# Patient Record
Sex: MALE | Race: WHITE | Employment: OTHER | URBAN - NONMETROPOLITAN AREA
[De-identification: names, ages, dates, MRNs, and addresses within clinical notes are randomized per-mention and may not be internally consistent; named-entity substitution may affect disease eponyms.]

---

## 2019-10-07 ENCOUNTER — HOSPITAL ENCOUNTER (OUTPATIENT)
Dept: GENERAL RADIOLOGY | Age: 54
Discharge: HOME | End: 2019-10-07
Payer: MEDICAID

## 2019-10-07 ENCOUNTER — PREP FOR PROCEDURE (OUTPATIENT)
Dept: UROLOGY | Age: 54
End: 2019-10-07

## 2019-10-07 ENCOUNTER — OFFICE VISIT (OUTPATIENT)
Dept: UROLOGY | Age: 54
End: 2019-10-07
Payer: MEDICAID

## 2019-10-07 VITALS
HEIGHT: 64 IN | DIASTOLIC BLOOD PRESSURE: 69 MMHG | WEIGHT: 185 LBS | TEMPERATURE: 96.9 F | SYSTOLIC BLOOD PRESSURE: 129 MMHG | BODY MASS INDEX: 31.58 KG/M2

## 2019-10-07 DIAGNOSIS — N20.0 KIDNEY STONE: ICD-10-CM

## 2019-10-07 DIAGNOSIS — N20.1 LEFT URETERAL CALCULUS: ICD-10-CM

## 2019-10-07 DIAGNOSIS — N20.1 LEFT URETERAL STONE: ICD-10-CM

## 2019-10-07 DIAGNOSIS — N20.1 LEFT URETERAL STONE: Primary | ICD-10-CM

## 2019-10-07 LAB
APPEARANCE FLUID: NORMAL
BILIRUBIN, POC: 0
BLOOD URINE, POC: NORMAL
CLARITY, POC: CLEAR
COLOR, POC: YELLOW
GLUCOSE URINE, POC: NORMAL
KETONES, POC: NORMAL
LEUKOCYTE EST, POC: NORMAL
NITRITE, POC: NORMAL
PH, POC: 7
PROTEIN, POC: NORMAL
SPECIFIC GRAVITY, POC: 1.02
UROBILINOGEN, POC: 4

## 2019-10-07 PROCEDURE — 3017F COLORECTAL CA SCREEN DOC REV: CPT | Performed by: NURSE PRACTITIONER

## 2019-10-07 PROCEDURE — 4004F PT TOBACCO SCREEN RCVD TLK: CPT | Performed by: NURSE PRACTITIONER

## 2019-10-07 PROCEDURE — G8417 CALC BMI ABV UP PARAM F/U: HCPCS | Performed by: NURSE PRACTITIONER

## 2019-10-07 PROCEDURE — 81002 URINALYSIS NONAUTO W/O SCOPE: CPT | Performed by: NURSE PRACTITIONER

## 2019-10-07 PROCEDURE — G8484 FLU IMMUNIZE NO ADMIN: HCPCS | Performed by: NURSE PRACTITIONER

## 2019-10-07 PROCEDURE — 74018 RADEX ABDOMEN 1 VIEW: CPT

## 2019-10-07 PROCEDURE — 99203 OFFICE O/P NEW LOW 30 MIN: CPT | Performed by: NURSE PRACTITIONER

## 2019-10-07 PROCEDURE — G8427 DOCREV CUR MEDS BY ELIG CLIN: HCPCS | Performed by: NURSE PRACTITIONER

## 2019-10-07 RX ORDER — DM/P-EPHED/ACETAMINOPH/DOXYLAM
5000 LIQUID (ML) ORAL DAILY
COMMUNITY
Start: 2019-09-28

## 2019-10-07 RX ORDER — HYDROCODONE BITARTRATE AND ACETAMINOPHEN 10; 325 MG/1; MG/1
TABLET ORAL
COMMUNITY
Start: 2019-10-04 | End: 2020-07-09 | Stop reason: ALTCHOICE

## 2019-10-07 RX ORDER — FLUOXETINE HYDROCHLORIDE 20 MG/1
20 CAPSULE ORAL DAILY
COMMUNITY
Start: 2019-09-28

## 2019-10-07 RX ORDER — TAMSULOSIN HYDROCHLORIDE 0.4 MG/1
0.4 CAPSULE ORAL DAILY
COMMUNITY
Start: 2019-09-28

## 2019-10-07 RX ORDER — PANTOPRAZOLE SODIUM 40 MG/1
40 TABLET, DELAYED RELEASE ORAL DAILY
COMMUNITY
Start: 2019-09-28

## 2019-10-07 RX ORDER — LISINOPRIL 10 MG/1
10 TABLET ORAL DAILY
COMMUNITY
Start: 2019-09-28

## 2019-10-08 ENCOUNTER — HOSPITAL ENCOUNTER (OUTPATIENT)
Age: 54
Setting detail: OUTPATIENT SURGERY
Discharge: HOME | End: 2019-10-08
Attending: UROLOGY | Admitting: UROLOGY
Payer: MEDICAID

## 2019-10-08 ENCOUNTER — ANESTHESIA (OUTPATIENT)
Dept: OPERATING ROOM | Age: 54
End: 2019-10-08
Payer: MEDICAID

## 2019-10-08 ENCOUNTER — ANESTHESIA EVENT (OUTPATIENT)
Dept: OPERATING ROOM | Age: 54
End: 2019-10-08
Payer: MEDICAID

## 2019-10-08 ENCOUNTER — HOSPITAL ENCOUNTER (OUTPATIENT)
Dept: GENERAL RADIOLOGY | Age: 54
Discharge: HOME | End: 2019-10-08
Payer: MEDICAID

## 2019-10-08 ENCOUNTER — APPOINTMENT (OUTPATIENT)
Dept: GENERAL RADIOLOGY | Age: 54
End: 2019-10-08
Attending: UROLOGY
Payer: MEDICAID

## 2019-10-08 VITALS
WEIGHT: 185 LBS | BODY MASS INDEX: 31.58 KG/M2 | HEART RATE: 71 BPM | DIASTOLIC BLOOD PRESSURE: 88 MMHG | HEIGHT: 64 IN | TEMPERATURE: 97.8 F | SYSTOLIC BLOOD PRESSURE: 129 MMHG | RESPIRATION RATE: 18 BRPM | OXYGEN SATURATION: 97 %

## 2019-10-08 VITALS
SYSTOLIC BLOOD PRESSURE: 123 MMHG | RESPIRATION RATE: 8 BRPM | DIASTOLIC BLOOD PRESSURE: 76 MMHG | OXYGEN SATURATION: 100 %

## 2019-10-08 DIAGNOSIS — Z98.890 S/P CYSTOSCOPY: ICD-10-CM

## 2019-10-08 LAB
EKG P AXIS: 86 DEGREES
EKG P-R INTERVAL: 226 MS
EKG Q-T INTERVAL: 382 MS
EKG QRS DURATION: 80 MS
EKG QTC CALCULATION (BAZETT): 401 MS
EKG T AXIS: 39 DEGREES
HCT VFR BLD CALC: 38.9 % (ref 42–52)
HEMOGLOBIN: 12.8 G/DL (ref 14–18)
MCH RBC QN AUTO: 30.9 PG (ref 27–31)
MCHC RBC AUTO-ENTMCNC: 32.9 G/DL (ref 33–37)
MCV RBC AUTO: 94 FL (ref 80–94)
PDW BLD-RTO: 12.4 % (ref 11.5–14.5)
PLATELET # BLD: 345 K/UL (ref 130–400)
PMV BLD AUTO: 9.2 FL (ref 9.4–12.4)
RBC # BLD: 4.14 M/UL (ref 4.7–6.1)
WBC # BLD: 8.9 K/UL (ref 4.8–10.8)

## 2019-10-08 PROCEDURE — 52356 CYSTO/URETERO W/LITHOTRIPSY: CPT | Performed by: UROLOGY

## 2019-10-08 PROCEDURE — 3600000014 HC SURGERY LEVEL 4 ADDTL 15MIN: Performed by: UROLOGY

## 2019-10-08 PROCEDURE — 2720000010 HC SURG SUPPLY STERILE: Performed by: UROLOGY

## 2019-10-08 PROCEDURE — C2617 STENT, NON-COR, TEM W/O DEL: HCPCS | Performed by: UROLOGY

## 2019-10-08 PROCEDURE — C1894 INTRO/SHEATH, NON-LASER: HCPCS | Performed by: UROLOGY

## 2019-10-08 PROCEDURE — 7100000001 HC PACU RECOVERY - ADDTL 15 MIN: Performed by: UROLOGY

## 2019-10-08 PROCEDURE — 6360000002 HC RX W HCPCS: Performed by: ANESTHESIOLOGY

## 2019-10-08 PROCEDURE — 7100000010 HC PHASE II RECOVERY - FIRST 15 MIN: Performed by: UROLOGY

## 2019-10-08 PROCEDURE — 3600000004 HC SURGERY LEVEL 4 BASE: Performed by: UROLOGY

## 2019-10-08 PROCEDURE — 7100000000 HC PACU RECOVERY - FIRST 15 MIN: Performed by: UROLOGY

## 2019-10-08 PROCEDURE — 3700000000 HC ANESTHESIA ATTENDED CARE: Performed by: UROLOGY

## 2019-10-08 PROCEDURE — 93010 ELECTROCARDIOGRAM REPORT: CPT | Performed by: INTERNAL MEDICINE

## 2019-10-08 PROCEDURE — 6370000000 HC RX 637 (ALT 250 FOR IP): Performed by: ANESTHESIOLOGY

## 2019-10-08 PROCEDURE — 2709999900 HC NON-CHARGEABLE SUPPLY: Performed by: UROLOGY

## 2019-10-08 PROCEDURE — 2580000003 HC RX 258: Performed by: ANESTHESIOLOGY

## 2019-10-08 PROCEDURE — 3209999900 FLUORO FOR SURGICAL PROCEDURES

## 2019-10-08 PROCEDURE — C1758 CATHETER, URETERAL: HCPCS | Performed by: UROLOGY

## 2019-10-08 PROCEDURE — 3700000001 HC ADD 15 MINUTES (ANESTHESIA): Performed by: UROLOGY

## 2019-10-08 PROCEDURE — 2500000003 HC RX 250 WO HCPCS: Performed by: ANESTHESIOLOGY

## 2019-10-08 PROCEDURE — C1769 GUIDE WIRE: HCPCS | Performed by: UROLOGY

## 2019-10-08 PROCEDURE — 93005 ELECTROCARDIOGRAM TRACING: CPT | Performed by: ANESTHESIOLOGY

## 2019-10-08 PROCEDURE — 36415 COLL VENOUS BLD VENIPUNCTURE: CPT

## 2019-10-08 PROCEDURE — 74420 UROGRAPHY RTRGR +-KUB: CPT

## 2019-10-08 PROCEDURE — 85027 COMPLETE CBC AUTOMATED: CPT

## 2019-10-08 DEVICE — URETERAL STENT
Type: IMPLANTABLE DEVICE | Site: URETER | Status: FUNCTIONAL
Brand: POLARIS™ ULTRA

## 2019-10-08 RX ORDER — SCOLOPAMINE TRANSDERMAL SYSTEM 1 MG/1
1 PATCH, EXTENDED RELEASE TRANSDERMAL
Status: DISCONTINUED | OUTPATIENT
Start: 2019-10-08 | End: 2019-10-08 | Stop reason: HOSPADM

## 2019-10-08 RX ORDER — MIDAZOLAM HYDROCHLORIDE 1 MG/ML
INJECTION INTRAMUSCULAR; INTRAVENOUS PRN
Status: DISCONTINUED | OUTPATIENT
Start: 2019-10-08 | End: 2019-10-08 | Stop reason: SDUPTHER

## 2019-10-08 RX ORDER — DEXAMETHASONE SODIUM PHOSPHATE 10 MG/ML
INJECTION INTRAMUSCULAR; INTRAVENOUS PRN
Status: DISCONTINUED | OUTPATIENT
Start: 2019-10-08 | End: 2019-10-08 | Stop reason: SDUPTHER

## 2019-10-08 RX ORDER — SODIUM CHLORIDE 0.9 % (FLUSH) 0.9 %
10 SYRINGE (ML) INJECTION EVERY 12 HOURS SCHEDULED
Status: DISCONTINUED | OUTPATIENT
Start: 2019-10-08 | End: 2019-10-08 | Stop reason: HOSPADM

## 2019-10-08 RX ORDER — LIDOCAINE HYDROCHLORIDE 10 MG/ML
INJECTION, SOLUTION INFILTRATION; PERINEURAL PRN
Status: DISCONTINUED | OUTPATIENT
Start: 2019-10-08 | End: 2019-10-08 | Stop reason: SDUPTHER

## 2019-10-08 RX ORDER — CIPROFLOXACIN 2 MG/ML
INJECTION, SOLUTION INTRAVENOUS PRN
Status: DISCONTINUED | OUTPATIENT
Start: 2019-10-08 | End: 2019-10-08 | Stop reason: SDUPTHER

## 2019-10-08 RX ORDER — FENTANYL CITRATE 50 UG/ML
50 INJECTION, SOLUTION INTRAMUSCULAR; INTRAVENOUS
Status: COMPLETED | OUTPATIENT
Start: 2019-10-08 | End: 2019-10-08

## 2019-10-08 RX ORDER — FENTANYL CITRATE 50 UG/ML
INJECTION, SOLUTION INTRAMUSCULAR; INTRAVENOUS PRN
Status: DISCONTINUED | OUTPATIENT
Start: 2019-10-08 | End: 2019-10-08 | Stop reason: SDUPTHER

## 2019-10-08 RX ORDER — PROPOFOL 10 MG/ML
INJECTION, EMULSION INTRAVENOUS PRN
Status: DISCONTINUED | OUTPATIENT
Start: 2019-10-08 | End: 2019-10-08 | Stop reason: SDUPTHER

## 2019-10-08 RX ORDER — LIDOCAINE HYDROCHLORIDE 10 MG/ML
1 INJECTION, SOLUTION EPIDURAL; INFILTRATION; INTRACAUDAL; PERINEURAL
Status: DISCONTINUED | OUTPATIENT
Start: 2019-10-08 | End: 2019-10-08 | Stop reason: HOSPADM

## 2019-10-08 RX ORDER — CIPROFLOXACIN 2 MG/ML
400 INJECTION, SOLUTION INTRAVENOUS ONCE
Status: DISCONTINUED | OUTPATIENT
Start: 2019-10-08 | End: 2019-10-08 | Stop reason: HOSPADM

## 2019-10-08 RX ORDER — EPHEDRINE SULFATE 50 MG/ML
INJECTION, SOLUTION INTRAVENOUS PRN
Status: DISCONTINUED | OUTPATIENT
Start: 2019-10-08 | End: 2019-10-08 | Stop reason: SDUPTHER

## 2019-10-08 RX ORDER — KETOROLAC TROMETHAMINE 30 MG/ML
INJECTION, SOLUTION INTRAMUSCULAR; INTRAVENOUS PRN
Status: DISCONTINUED | OUTPATIENT
Start: 2019-10-08 | End: 2019-10-08 | Stop reason: SDUPTHER

## 2019-10-08 RX ORDER — SODIUM CHLORIDE 0.9 % (FLUSH) 0.9 %
10 SYRINGE (ML) INJECTION PRN
Status: DISCONTINUED | OUTPATIENT
Start: 2019-10-08 | End: 2019-10-08 | Stop reason: HOSPADM

## 2019-10-08 RX ORDER — SODIUM CHLORIDE, SODIUM LACTATE, POTASSIUM CHLORIDE, CALCIUM CHLORIDE 600; 310; 30; 20 MG/100ML; MG/100ML; MG/100ML; MG/100ML
INJECTION, SOLUTION INTRAVENOUS CONTINUOUS
Status: DISCONTINUED | OUTPATIENT
Start: 2019-10-08 | End: 2019-10-08 | Stop reason: HOSPADM

## 2019-10-08 RX ORDER — ONDANSETRON 2 MG/ML
INJECTION INTRAMUSCULAR; INTRAVENOUS PRN
Status: DISCONTINUED | OUTPATIENT
Start: 2019-10-08 | End: 2019-10-08 | Stop reason: SDUPTHER

## 2019-10-08 RX ORDER — MIDAZOLAM HYDROCHLORIDE 1 MG/ML
2 INJECTION INTRAMUSCULAR; INTRAVENOUS
Status: DISCONTINUED | OUTPATIENT
Start: 2019-10-08 | End: 2019-10-08 | Stop reason: HOSPADM

## 2019-10-08 RX ORDER — FENTANYL CITRATE 50 UG/ML
25 INJECTION, SOLUTION INTRAMUSCULAR; INTRAVENOUS
Status: DISCONTINUED | OUTPATIENT
Start: 2019-10-08 | End: 2019-10-08 | Stop reason: HOSPADM

## 2019-10-08 RX ADMIN — FENTANYL CITRATE 100 MCG: 50 INJECTION INTRAMUSCULAR; INTRAVENOUS at 13:38

## 2019-10-08 RX ADMIN — CIPROFLOXACIN 400 MG: 2 INJECTION INTRAVENOUS at 13:43

## 2019-10-08 RX ADMIN — MIDAZOLAM 2 MG: 1 INJECTION INTRAMUSCULAR; INTRAVENOUS at 13:31

## 2019-10-08 RX ADMIN — PROPOFOL 150 MG: 10 INJECTION, EMULSION INTRAVENOUS at 13:38

## 2019-10-08 RX ADMIN — SODIUM CHLORIDE, POTASSIUM CHLORIDE, SODIUM LACTATE AND CALCIUM CHLORIDE: 600; 310; 30; 20 INJECTION, SOLUTION INTRAVENOUS at 13:09

## 2019-10-08 RX ADMIN — KETOROLAC TROMETHAMINE 30 MG: 30 INJECTION, SOLUTION INTRAMUSCULAR; INTRAVENOUS at 14:16

## 2019-10-08 RX ADMIN — FENTANYL CITRATE 50 MCG: 50 INJECTION, SOLUTION INTRAMUSCULAR; INTRAVENOUS at 14:49

## 2019-10-08 RX ADMIN — EPHEDRINE SULFATE 5 MG: 50 INJECTION INTRAMUSCULAR; INTRAVENOUS; SUBCUTANEOUS at 14:05

## 2019-10-08 RX ADMIN — PROPOFOL 50 MG: 10 INJECTION, EMULSION INTRAVENOUS at 13:42

## 2019-10-08 RX ADMIN — ONDANSETRON HYDROCHLORIDE 4 MG: 2 INJECTION, SOLUTION INTRAMUSCULAR; INTRAVENOUS at 13:52

## 2019-10-08 RX ADMIN — DEXAMETHASONE SODIUM PHOSPHATE 10 MG: 10 INJECTION INTRAMUSCULAR; INTRAVENOUS at 13:52

## 2019-10-08 RX ADMIN — LIDOCAINE HYDROCHLORIDE 100 MG: 10 INJECTION, SOLUTION INFILTRATION; PERINEURAL at 13:39

## 2019-10-08 ASSESSMENT — LIFESTYLE VARIABLES: SMOKING_STATUS: 0

## 2019-10-08 ASSESSMENT — PAIN SCALES - GENERAL
PAINLEVEL_OUTOF10: 0
PAINLEVEL_OUTOF10: 7
PAINLEVEL_OUTOF10: 0

## 2019-10-08 ASSESSMENT — PAIN - FUNCTIONAL ASSESSMENT: PAIN_FUNCTIONAL_ASSESSMENT: 0-10

## 2019-10-09 ENCOUNTER — TELEPHONE (OUTPATIENT)
Dept: UROLOGY | Age: 54
End: 2019-10-09

## 2019-10-17 ENCOUNTER — OFFICE VISIT (OUTPATIENT)
Dept: UROLOGY | Age: 54
End: 2019-10-17
Payer: MEDICAID

## 2019-10-17 VITALS
TEMPERATURE: 97.7 F | SYSTOLIC BLOOD PRESSURE: 126 MMHG | HEIGHT: 66 IN | DIASTOLIC BLOOD PRESSURE: 67 MMHG | WEIGHT: 185 LBS | BODY MASS INDEX: 29.73 KG/M2

## 2019-10-17 DIAGNOSIS — Z46.6 ENCOUNTER FOR REMOVAL OF URETERAL STENT: Primary | ICD-10-CM

## 2019-10-17 DIAGNOSIS — Z80.42 FAMILY HISTORY OF PROSTATE CANCER: ICD-10-CM

## 2019-10-17 DIAGNOSIS — N20.1 LEFT URETERAL CALCULUS: ICD-10-CM

## 2019-10-17 DIAGNOSIS — Z87.448 HX OF HYDRONEPHROSIS: ICD-10-CM

## 2019-10-17 DIAGNOSIS — Z80.51 FAMILY HISTORY OF KIDNEY CANCER: ICD-10-CM

## 2019-10-17 LAB
BACTERIA URINE, POC: ABNORMAL
BILIRUBIN URINE: 0 MG/DL
BLOOD, URINE: POSITIVE
CASTS URINE, POC: ABNORMAL
CLARITY: ABNORMAL
COLOR: YELLOW
CRYSTALS URINE, POC: ABNORMAL
EPI CELLS URINE, POC: ABNORMAL
GLUCOSE URINE: ABNORMAL
KETONES, URINE: NEGATIVE
LEUKOCYTE EST, POC: ABNORMAL
NITRITE, URINE: NEGATIVE
PH UA: 6.5 (ref 4.5–8)
PROTEIN UA: POSITIVE
RBC URINE, POC: ABNORMAL
SPECIFIC GRAVITY UA: 1.02 (ref 1–1.03)
UROBILINOGEN, URINE: NORMAL
WBC URINE, POC: ABNORMAL
YEAST URINE, POC: ABNORMAL

## 2019-10-17 PROCEDURE — 4004F PT TOBACCO SCREEN RCVD TLK: CPT | Performed by: NURSE PRACTITIONER

## 2019-10-17 PROCEDURE — 99214 OFFICE O/P EST MOD 30 MIN: CPT | Performed by: NURSE PRACTITIONER

## 2019-10-17 PROCEDURE — G8417 CALC BMI ABV UP PARAM F/U: HCPCS | Performed by: NURSE PRACTITIONER

## 2019-10-17 PROCEDURE — 81001 URINALYSIS AUTO W/SCOPE: CPT | Performed by: NURSE PRACTITIONER

## 2019-10-17 PROCEDURE — 3017F COLORECTAL CA SCREEN DOC REV: CPT | Performed by: NURSE PRACTITIONER

## 2019-10-17 PROCEDURE — G8484 FLU IMMUNIZE NO ADMIN: HCPCS | Performed by: NURSE PRACTITIONER

## 2019-10-17 PROCEDURE — G8427 DOCREV CUR MEDS BY ELIG CLIN: HCPCS | Performed by: NURSE PRACTITIONER

## 2019-11-21 ENCOUNTER — HOSPITAL ENCOUNTER (OUTPATIENT)
Dept: ULTRASOUND IMAGING | Age: 54
Discharge: HOME | End: 2019-11-21
Payer: MEDICAID

## 2019-11-21 DIAGNOSIS — Z87.448 HX OF HYDRONEPHROSIS: ICD-10-CM

## 2019-11-21 DIAGNOSIS — Z80.42 FAMILY HISTORY OF PROSTATE CANCER: ICD-10-CM

## 2019-11-21 LAB — PROSTATE SPECIFIC ANTIGEN: 1.67 NG/ML (ref 0–4)

## 2019-11-21 PROCEDURE — 76770 US EXAM ABDO BACK WALL COMP: CPT

## 2019-11-27 ENCOUNTER — OFFICE VISIT (OUTPATIENT)
Dept: UROLOGY | Age: 54
End: 2019-11-27
Payer: MEDICAID

## 2019-11-27 ENCOUNTER — TELEPHONE (OUTPATIENT)
Dept: UROLOGY | Age: 54
End: 2019-11-27

## 2019-11-27 DIAGNOSIS — Z80.42 FAMILY HISTORY OF PROSTATE CANCER: ICD-10-CM

## 2019-11-27 DIAGNOSIS — Z87.448 HX OF HYDRONEPHROSIS: ICD-10-CM

## 2019-11-27 DIAGNOSIS — N20.0 NEPHROLITHIASIS: Primary | ICD-10-CM

## 2019-11-27 LAB
BILIRUBIN, POC: NORMAL
BLOOD URINE, POC: NORMAL
CLARITY, POC: CLEAR
COLOR, POC: YELLOW
GLUCOSE URINE, POC: NORMAL
KETONES, POC: NORMAL
LEUKOCYTE EST, POC: NORMAL
NITRITE, POC: NORMAL
PH, POC: 6.5
PROTEIN, POC: NORMAL
SPECIFIC GRAVITY, POC: 1.02
UROBILINOGEN, POC: 1

## 2019-11-27 PROCEDURE — 99214 OFFICE O/P EST MOD 30 MIN: CPT | Performed by: NURSE PRACTITIONER

## 2019-11-27 PROCEDURE — 4004F PT TOBACCO SCREEN RCVD TLK: CPT | Performed by: NURSE PRACTITIONER

## 2019-11-27 PROCEDURE — G8484 FLU IMMUNIZE NO ADMIN: HCPCS | Performed by: NURSE PRACTITIONER

## 2019-11-27 PROCEDURE — G8427 DOCREV CUR MEDS BY ELIG CLIN: HCPCS | Performed by: NURSE PRACTITIONER

## 2019-11-27 PROCEDURE — 3017F COLORECTAL CA SCREEN DOC REV: CPT | Performed by: NURSE PRACTITIONER

## 2019-11-27 PROCEDURE — G8417 CALC BMI ABV UP PARAM F/U: HCPCS | Performed by: NURSE PRACTITIONER

## 2019-11-27 PROCEDURE — 81003 URINALYSIS AUTO W/O SCOPE: CPT | Performed by: NURSE PRACTITIONER

## 2020-03-06 ENCOUNTER — OFFICE VISIT (OUTPATIENT)
Dept: UROLOGY | Age: 55
End: 2020-03-06
Payer: MEDICAID

## 2020-03-06 ENCOUNTER — HOSPITAL ENCOUNTER (OUTPATIENT)
Dept: GENERAL RADIOLOGY | Age: 55
Discharge: HOME | End: 2020-03-06
Payer: MEDICAID

## 2020-03-06 VITALS — BODY MASS INDEX: 29.86 KG/M2 | WEIGHT: 185 LBS | TEMPERATURE: 97.5 F

## 2020-03-06 PROBLEM — N20.0 RIGHT NEPHROLITHIASIS: Status: ACTIVE | Noted: 2020-03-06

## 2020-03-06 PROCEDURE — 74018 RADEX ABDOMEN 1 VIEW: CPT

## 2020-03-06 PROCEDURE — 4004F PT TOBACCO SCREEN RCVD TLK: CPT | Performed by: NURSE PRACTITIONER

## 2020-03-06 PROCEDURE — 99214 OFFICE O/P EST MOD 30 MIN: CPT | Performed by: NURSE PRACTITIONER

## 2020-03-06 PROCEDURE — G8484 FLU IMMUNIZE NO ADMIN: HCPCS | Performed by: NURSE PRACTITIONER

## 2020-03-06 PROCEDURE — 3017F COLORECTAL CA SCREEN DOC REV: CPT | Performed by: NURSE PRACTITIONER

## 2020-03-06 PROCEDURE — G8417 CALC BMI ABV UP PARAM F/U: HCPCS | Performed by: NURSE PRACTITIONER

## 2020-03-06 PROCEDURE — G8427 DOCREV CUR MEDS BY ELIG CLIN: HCPCS | Performed by: NURSE PRACTITIONER

## 2020-03-06 NOTE — PROGRESS NOTES
Nando Moreno is a 53 y.o. male who presents today   Chief Complaint   Patient presents with    Follow-up     I am here today for my 3 month kub  followup            HPI:       Nando Moreno presents for follow-up of nephrolithiasis. Patient also has a history of renal cyst and arthrogryposis. Patient last underwent intervention for left ureteral calculus in October 2019. He follows up today with a KUB. KUB does show bilateral nephrolithiasis with the largest stone being on the right measuring around 8.4 mm and  a left renal stone measuring around 4.5 mm. Patient also has a family history of renal cancer and one brother and prostate cancer in his father. He is requesting genetic counseling.  Last PSA was normal, renal US showed only renal cyst.    Past Medical History:   Diagnosis Date    Arthrogryposis     congenital    GERD (gastroesophageal reflux disease)     Kidney stones     Migraines     Pulmonary embolus (HCC)     Seizure ([address]     Thyroid disease     Graves disease      Past Surgical History:   Procedure Laterality Date    APPENDECTOMY      CYSTOSCOPY Left 10/8/2019    CYSTOSCOPY LEFT URETEROSCOPY LASER LITHOTRIPSY, STONE REMOVAL performed by Mary Rose [facility] at [facility] Left 10/8/2019    PLACEMENT OF DOUBLE J STENT performed by Mary Rose MD at [address]         Family History   Problem Relation Age of Onset    Malig Hypertherm Other        Social History     Tobacco Use    Smoking status: Former Smoker    Smokeless tobacco: Current User     Types: Snuff   Substance Use Topics    Alcohol use: Not Currently      Current Outpatient Medications   Medication Sig Dispense Refill    lisinopril (PRINIVIL;ZESTRIL) 10 MG tablet daily       pantoprazole (PROTONIX) 40 MG tablet daily       FLUoxetine (PROZAC) 20 MG capsule daily       D 5000 5000 units CAPS capsule daily       topiramate (TOPAMAX) 200 MG tablet daily Indications:

## 2020-03-06 NOTE — H&P
Albino Hood is a 53 y.o. male who presents today   Chief Complaint   Patient presents with    Follow-up     I am here today for my 3 month kub  followup            HPI:       Albino Hood presents for follow-up of nephrolithiasis. Patient also has a history of renal cyst and arthrogryposis. Patient last underwent intervention for left ureteral calculus in October 2019. He follows up today with a KUB. KUB does show bilateral nephrolithiasis with the largest stone being on the right measuring around 8.4 mm and  a left renal stone measuring around 4.5 mm. Patient also has a family history of renal cancer and one brother and prostate cancer in his father. He is requesting genetic counseling.  Last PSA was normal, renal US showed only renal cyst.    Past Medical History:   Diagnosis Date    Arthrogryposis     congenital    GERD (gastroesophageal reflux disease)     Kidney stones     Migraines     Pulmonary embolus (HCC)     Seizure ([address]     Thyroid disease     Graves disease      Past Surgical History:   Procedure Laterality Date    APPENDECTOMY      CYSTOSCOPY Left 10/8/2019    CYSTOSCOPY LEFT URETEROSCOPY LASER LITHOTRIPSY, STONE REMOVAL performed by Jimi Thornton MD at [facility] Left 10/8/2019    PLACEMENT OF DOUBLE J STENT performed by Jimi Thornton MD at [address]         Family History   Problem Relation Age of Onset    Malig Hypertherm Other        Social History     Tobacco Use    Smoking status: Former Smoker    Smokeless tobacco: Current User     Types: Snuff   Substance Use Topics    Alcohol use: Not Currently      Current Outpatient Medications   Medication Sig Dispense Refill    lisinopril (PRINIVIL;ZESTRIL) 10 MG tablet daily       pantoprazole (PROTONIX) 40 MG tablet daily       FLUoxetine (PROZAC) 20 MG capsule daily       D 5000 5000 units CAPS capsule daily       topiramate (TOPAMAX) 200 MG tablet daily Indications: migraines       tamsulosin (FLOMAX) 0.4 MG capsule daily       HYDROcodone-acetaminophen (NORCO)  MG per tablet        No current facility-administered medications for this visit. Allergies   Allergen Reactions    Codeine     Sulfa Antibiotics          Subjective:      Review of Systems   Constitutional: Negative for chills and fever. Genitourinary: Negative for difficulty urinating, dysuria, flank pain, frequency, hematuria and urgency. All other systems reviewed and are negative. Objective:     Physical Exam  Vitals signs reviewed. Constitutional:       General: He is not in acute distress. Appearance: He is well-developed. HENT:      Head: Normocephalic and atraumatic. Eyes:      Conjunctiva/sclera: Conjunctivae normal.      Pupils: Pupils are equal, round, and reactive to light. Neck:      Musculoskeletal: Normal range of motion and neck supple. Cardiovascular:      Rate and Rhythm: Normal rate. Pulmonary:      Effort: Pulmonary effort is normal. No respiratory distress. Abdominal:      Palpations: Abdomen is soft. Musculoskeletal: Normal range of motion. Skin:     General: Skin is warm and dry. Neurological:      Mental Status: He is alert and oriented to person, place, and time. Psychiatric:         Behavior: Behavior normal.         Thought Content: Thought content normal.         Judgment: Judgment normal.       Temp 97.5 °F (36.4 °C) (Temporal)   Wt 185 lb (83.9 kg)   BMI 29.86 kg/m²       DATA:  No results found for this visit on 03/06/20. Lab Results   Component Value Date    PSA 1.67 11/21/2019         Assessment/ Plan       Diagnosis Orders   1. Right nephrolithiasis     2. Renal cyst     3. Family history of prostate cancer in father     3. Family history of renal cancer     I have contacted genetic counselor and requested information on how to schedule appointment for patient.     Due to large right renal stone patient will undergo right renal ESWL, possible cystoscopy right ureteral stent placement. The possibility of stent placement and second procedure was discussed with patient due to size of stone,  He agreed and understood. Risks of shockwave lithotripsy were discussed with patient including infection, bleeding, injury to kidney or adjacent organs, renal hemorrhage or hematoma, possible need of stent and removal of stent; or repeat procedure as well as the anesthesia risks of MI or CVA. Discussed use, benefit, and side effects of prescribed medications. All patient questions answered. Pt voiced understanding. Patient agreed with treatment plan. Electronically signed by OSCAR Aguero on 3/6/2020 at 11:00 AM     EMR dragon/transcription disclaimer: Much of this encounter note is electronic transcription/translation of spoken language to printed tach. Electronic translation of spoken language may be erroneous, or at times, nonsensical words or phrases may be inadvertently transcribed.  Although, I have reviewed the note for such errors, some may still exist.

## 2020-03-12 ENCOUNTER — TELEPHONE (OUTPATIENT)
Dept: UROLOGY | Age: 55
End: 2020-03-12

## 2020-03-12 NOTE — TELEPHONE ENCOUNTER
Pt called wanting to cancel procedure on 04/24/2020, however pt will still come to appt on 04/14/20 to discuss this.     Thank you

## 2020-03-13 NOTE — TELEPHONE ENCOUNTER
Pt called wanting to cancel procedure on 03/24/2020, however pt will still come to appt on 04/14/20 to discuss this.

## 2020-07-09 ENCOUNTER — OFFICE VISIT (OUTPATIENT)
Dept: UROLOGY | Age: 55
End: 2020-07-09
Payer: MEDICAID

## 2020-07-09 VITALS — WEIGHT: 187 LBS | HEIGHT: 64 IN | BODY MASS INDEX: 31.92 KG/M2 | TEMPERATURE: 97.8 F

## 2020-07-09 LAB
BILIRUBIN, POC: NORMAL
BLOOD URINE, POC: NORMAL
CLARITY, POC: CLEAR
COLOR, POC: YELLOW
GLUCOSE URINE, POC: NORMAL
KETONES, POC: NORMAL
LEUKOCYTE EST, POC: NORMAL
NITRITE, POC: NORMAL
PH, POC: 7
PROTEIN, POC: NORMAL
SPECIFIC GRAVITY, POC: 1.01
UROBILINOGEN, POC: 0.2

## 2020-07-09 PROCEDURE — G8417 CALC BMI ABV UP PARAM F/U: HCPCS | Performed by: NURSE PRACTITIONER

## 2020-07-09 PROCEDURE — 3017F COLORECTAL CA SCREEN DOC REV: CPT | Performed by: NURSE PRACTITIONER

## 2020-07-09 PROCEDURE — 4004F PT TOBACCO SCREEN RCVD TLK: CPT | Performed by: NURSE PRACTITIONER

## 2020-07-09 PROCEDURE — 81003 URINALYSIS AUTO W/O SCOPE: CPT | Performed by: NURSE PRACTITIONER

## 2020-07-09 PROCEDURE — G8427 DOCREV CUR MEDS BY ELIG CLIN: HCPCS | Performed by: NURSE PRACTITIONER

## 2020-07-09 PROCEDURE — 99213 OFFICE O/P EST LOW 20 MIN: CPT | Performed by: NURSE PRACTITIONER

## 2020-07-09 RX ORDER — ZINC GLUCONATE 50 MG
50 TABLET ORAL DAILY
COMMUNITY

## 2020-07-09 NOTE — PROGRESS NOTES
Jojo Hinds is a 53 y.o. male who presents today   Chief Complaint   Patient presents with    Follow-up     patient following up on kidney stones. was scheduled for surgery back in March for this, but cancelled. I           HPI:       Jojo Hinds presents for follow-up nephrolithiasis. He was previously scheduled to undergo right ESWL, however elective procedures were canceled due to [place]. Last intervention was completed in October 2019 due to a left ureteral stone. Last imaging was completed in March and showed a right renal stone around 8.4 mm and a left renal stone around 4.5 mm. Patient has a brother with renal cancer and a father with prostate cancer. I have contacted genetic counselor to speak with the patient. Patient would like to proceed with right renal ESWL at this time.     Past Medical History:   Diagnosis Date    Arthrogryposis     congenital    GERD (gastroesophageal reflux disease)     Kidney stones     Migraines     Pulmonary embolus ([address]     Seizure ([address]     Thyroid disease     Graves disease      Past Surgical History:   Procedure Laterality Date    APPENDECTOMY      CYSTOSCOPY Left 10/8/2019    CYSTOSCOPY LEFT URETEROSCOPY LASER LITHOTRIPSY, STONE REMOVAL performed by Edita Martin MD at [facility] Left 10/8/2019    PLACEMENT OF DOUBLE J STENT performed by Edita Martin MD at [address]         Family History   Problem Relation Age of Onset    Malig Hypertherm Other        Social History     Tobacco Use    Smoking status: Former Smoker    Smokeless tobacco: Current User     Types: Snuff   Substance Use Topics    Alcohol use: Not Currently      Current Outpatient Medications   Medication Sig Dispense Refill    zinc gluconate 50 MG tablet Take 50 mg by mouth daily      mirabegron (MYRBETRIQ) 25 MG TB24 Take 1 tablet by mouth daily 30 tablet 3    lisinopril (PRINIVIL;ZESTRIL) 10 MG tablet daily       pantoprazole (PROTONIX) 40 MG tablet daily       FLUoxetine (PROZAC) 20 MG capsule daily       D 5000 5000 units CAPS capsule daily       topiramate (TOPAMAX) 200 MG tablet daily Indications: migraines       tamsulosin (FLOMAX) 0.4 MG capsule daily        No current facility-administered medications for this visit. Allergies   Allergen Reactions    Codeine     Sulfa Antibiotics          Subjective:      Review of Systems   Constitutional: Negative for chills and fever. Genitourinary: Positive for flank pain. Negative for difficulty urinating, hematuria and urgency. All other systems reviewed and are negative. Objective:     Physical Exam  Vitals signs reviewed. Constitutional:       General: He is not in acute distress. Appearance: He is well-developed. HENT:      Head: Normocephalic and atraumatic. Eyes:      Conjunctiva/sclera: Conjunctivae normal.      Pupils: Pupils are equal, round, and reactive to light. Neck:      Musculoskeletal: Normal range of motion and neck supple. Cardiovascular:      Rate and Rhythm: Normal rate. Pulmonary:      Effort: Pulmonary effort is normal. No respiratory distress. Abdominal:      Palpations: Abdomen is soft. Musculoskeletal: Normal range of motion. Skin:     General: Skin is warm and dry. Neurological:      Mental Status: He is alert and oriented to person, place, and time. Psychiatric:         Behavior: Behavior normal.         Thought Content:  Thought content normal.         Judgment: Judgment normal.       Temp 97.8 °F (36.6 °C) (Temporal)   Ht 5' 4\" (1.626 m)   Wt 187 lb (84.8 kg)   BMI 32.10 kg/m²       DATA:  Results for orders placed or performed in visit on 07/09/20   POCT Urinalysis No Micro (Auto)   Result Value Ref Range    Color, UA yellow     Clarity, UA clear     Glucose, UA POC neg     Bilirubin, UA neg     Ketones, UA neg     Spec Grav, UA 1.010     Blood, UA POC neg     pH, UA 7.0     Protein, UA POC neg     Urobilinogen, UA 0.2     Leukocytes, UA neg     Nitrite, UA neg      Lab Results   Component Value Date    PSA 1.67 11/21/2019         Assessment/ Plan       Diagnosis Orders   1. Right nephrolithiasis  POCT Urinalysis No Micro (Auto)     Patient will be placed on the schedule for right renal ESWL. Risks of shockwave lithotripsy were discussed with patient including infection, bleeding, injury to kidney or adjacent organs, renal hemorrhage or hematoma, possible need of stent and removal of stent; or repeat procedure as well as the anesthesia risks of MI or CVA. Discussed use, benefit, and side effects of prescribed medications. All patient questions answered. Pt voiced understanding. Patient agreed with treatment plan. Electronically signed by OSCAR Alvarenga on 7/10/2020 at 12:53 PM     EMR dragon/transcription disclaimer: Much of this encounter note is electronic transcription/translation of spoken language to printed tach. Electronic translation of spoken language may be erroneous, or at times, nonsensical words or phrases may be inadvertently transcribed.  Although, I have reviewed the note for such errors, some may still exist.

## 2020-07-27 ENCOUNTER — OFFICE VISIT (OUTPATIENT)
Age: 55
End: 2020-07-27

## 2020-07-27 ENCOUNTER — HOSPITAL ENCOUNTER (OUTPATIENT)
Dept: PREADMISSION TESTING | Age: 55
Setting detail: OUTPATIENT SURGERY
Discharge: HOME | End: 2020-07-31
Payer: MEDICAID

## 2020-07-27 VITALS — WEIGHT: 183 LBS | HEIGHT: 64 IN | BODY MASS INDEX: 31.24 KG/M2

## 2020-07-27 VITALS — TEMPERATURE: 96.3 F | HEART RATE: 94 BPM | OXYGEN SATURATION: 96 %

## 2020-07-27 LAB
ANION GAP SERPL CALCULATED.3IONS-SCNC: 14 MMOL/L (ref 7–19)
APTT: 33.8 SEC (ref 26–36.2)
BASOPHILS ABSOLUTE: 0.1 K/UL (ref 0–0.2)
BASOPHILS RELATIVE PERCENT: 0.8 % (ref 0–1)
BUN BLDV-MCNC: 21 MG/DL (ref 6–20)
CALCIUM SERPL-MCNC: 9.1 MG/DL (ref 8.6–10)
CHLORIDE BLD-SCNC: 105 MMOL/L (ref 98–111)
CO2: 17 MMOL/L (ref 22–29)
COLLAGEN EPINEPHRINE TIME: 107 SEC (ref 84–176)
CREAT SERPL-MCNC: 0.8 MG/DL (ref 0.5–1.2)
EOSINOPHILS ABSOLUTE: 0 K/UL (ref 0–0.6)
EOSINOPHILS RELATIVE PERCENT: 0.6 % (ref 0–5)
GFR AFRICAN AMERICAN: >59
GFR NON-AFRICAN AMERICAN: >60
GLUCOSE BLD-MCNC: 121 MG/DL (ref 74–109)
HCT VFR BLD CALC: 42.3 % (ref 42–52)
HEMOGLOBIN: 13.9 G/DL (ref 14–18)
IMMATURE GRANULOCYTES #: 0 K/UL
INR BLD: 1.14 (ref 0.88–1.18)
LYMPHOCYTES ABSOLUTE: 2.3 K/UL (ref 1.1–4.5)
LYMPHOCYTES RELATIVE PERCENT: 35.2 % (ref 20–40)
MCH RBC QN AUTO: 30.6 PG (ref 27–31)
MCHC RBC AUTO-ENTMCNC: 32.9 G/DL (ref 33–37)
MCV RBC AUTO: 93.2 FL (ref 80–94)
MONOCYTES ABSOLUTE: 0.5 K/UL (ref 0–0.9)
MONOCYTES RELATIVE PERCENT: 7.6 % (ref 0–10)
NEUTROPHILS ABSOLUTE: 3.6 K/UL (ref 1.5–7.5)
NEUTROPHILS RELATIVE PERCENT: 55.5 % (ref 50–65)
PDW BLD-RTO: 13.2 % (ref 11.5–14.5)
PLATELET # BLD: 225 K/UL (ref 130–400)
PLATELET FUNCTION INTERPRETATION: NORMAL
PMV BLD AUTO: 10.4 FL (ref 9.4–12.4)
POTASSIUM SERPL-SCNC: 3.9 MMOL/L (ref 3.5–5)
PROTHROMBIN TIME: 14.6 SEC (ref 12–14.6)
RBC # BLD: 4.54 M/UL (ref 4.7–6.1)
SODIUM BLD-SCNC: 136 MMOL/L (ref 136–145)
WBC # BLD: 6.5 K/UL (ref 4.8–10.8)

## 2020-07-27 PROCEDURE — 85025 COMPLETE CBC W/AUTO DIFF WBC: CPT

## 2020-07-27 PROCEDURE — 85610 PROTHROMBIN TIME: CPT

## 2020-07-27 PROCEDURE — 85730 THROMBOPLASTIN TIME PARTIAL: CPT

## 2020-07-27 PROCEDURE — 80048 BASIC METABOLIC PNL TOTAL CA: CPT

## 2020-07-27 PROCEDURE — 93005 ELECTROCARDIOGRAM TRACING: CPT | Performed by: UROLOGY

## 2020-07-27 PROCEDURE — 85576 BLOOD PLATELET AGGREGATION: CPT

## 2020-07-27 RX ORDER — CIPROFLOXACIN 2 MG/ML
400 INJECTION, SOLUTION INTRAVENOUS ONCE
Status: CANCELLED | OUTPATIENT
Start: 2020-07-30

## 2020-07-28 LAB
EKG P AXIS: 52 DEGREES
EKG P-R INTERVAL: 196 MS
EKG Q-T INTERVAL: 358 MS
EKG QRS DURATION: 80 MS
EKG QTC CALCULATION (BAZETT): 391 MS
EKG T AXIS: 38 DEGREES

## 2020-07-28 PROCEDURE — 93010 ELECTROCARDIOGRAM REPORT: CPT | Performed by: INTERNAL MEDICINE

## 2020-07-29 LAB
REPORT: NORMAL
SARS-COV-2: NOT DETECTED
THIS TEST SENT TO: NORMAL

## 2020-07-30 ENCOUNTER — ANESTHESIA (OUTPATIENT)
Dept: OPERATING ROOM | Age: 55
End: 2020-07-30
Payer: MEDICAID

## 2020-07-30 ENCOUNTER — APPOINTMENT (OUTPATIENT)
Dept: GENERAL RADIOLOGY | Age: 55
End: 2020-07-30
Attending: UROLOGY
Payer: MEDICAID

## 2020-07-30 ENCOUNTER — ANESTHESIA EVENT (OUTPATIENT)
Dept: OPERATING ROOM | Age: 55
End: 2020-07-30
Payer: MEDICAID

## 2020-07-30 ENCOUNTER — HOSPITAL ENCOUNTER (OUTPATIENT)
Age: 55
Setting detail: OUTPATIENT SURGERY
Discharge: HOME | End: 2020-07-30
Attending: UROLOGY | Admitting: UROLOGY
Payer: MEDICAID

## 2020-07-30 VITALS
HEART RATE: 70 BPM | RESPIRATION RATE: 16 BRPM | TEMPERATURE: 97 F | SYSTOLIC BLOOD PRESSURE: 128 MMHG | BODY MASS INDEX: 31.58 KG/M2 | OXYGEN SATURATION: 99 % | WEIGHT: 185 LBS | HEIGHT: 64 IN | DIASTOLIC BLOOD PRESSURE: 79 MMHG

## 2020-07-30 VITALS
SYSTOLIC BLOOD PRESSURE: 92 MMHG | TEMPERATURE: 94.1 F | RESPIRATION RATE: 2 BRPM | OXYGEN SATURATION: 100 % | DIASTOLIC BLOOD PRESSURE: 59 MMHG

## 2020-07-30 LAB
HCT VFR BLD CALC: 43.2 % (ref 42–52)
HEMOGLOBIN: 13.4 G/DL (ref 14–18)

## 2020-07-30 PROCEDURE — 6360000002 HC RX W HCPCS: Performed by: UROLOGY

## 2020-07-30 PROCEDURE — 2580000003 HC RX 258: Performed by: ANESTHESIOLOGY

## 2020-07-30 PROCEDURE — 3700000000 HC ANESTHESIA ATTENDED CARE: Performed by: UROLOGY

## 2020-07-30 PROCEDURE — 2580000003 HC RX 258: Performed by: NURSE ANESTHETIST, CERTIFIED REGISTERED

## 2020-07-30 PROCEDURE — 7100000011 HC PHASE II RECOVERY - ADDTL 15 MIN: Performed by: UROLOGY

## 2020-07-30 PROCEDURE — 50590 FRAGMENTING OF KIDNEY STONE: CPT | Performed by: UROLOGY

## 2020-07-30 PROCEDURE — 3700000001 HC ADD 15 MINUTES (ANESTHESIA): Performed by: UROLOGY

## 2020-07-30 PROCEDURE — 36415 COLL VENOUS BLD VENIPUNCTURE: CPT

## 2020-07-30 PROCEDURE — 6360000002 HC RX W HCPCS: Performed by: NURSE ANESTHETIST, CERTIFIED REGISTERED

## 2020-07-30 PROCEDURE — 7100000000 HC PACU RECOVERY - FIRST 15 MIN: Performed by: UROLOGY

## 2020-07-30 PROCEDURE — 6370000000 HC RX 637 (ALT 250 FOR IP): Performed by: ANESTHESIOLOGY

## 2020-07-30 PROCEDURE — 2709999900 HC NON-CHARGEABLE SUPPLY: Performed by: UROLOGY

## 2020-07-30 PROCEDURE — 7100000001 HC PACU RECOVERY - ADDTL 15 MIN: Performed by: UROLOGY

## 2020-07-30 PROCEDURE — 7100000010 HC PHASE II RECOVERY - FIRST 15 MIN: Performed by: UROLOGY

## 2020-07-30 PROCEDURE — 2500000003 HC RX 250 WO HCPCS: Performed by: NURSE ANESTHETIST, CERTIFIED REGISTERED

## 2020-07-30 PROCEDURE — 85014 HEMATOCRIT: CPT

## 2020-07-30 PROCEDURE — 85018 HEMOGLOBIN: CPT

## 2020-07-30 PROCEDURE — 74018 RADEX ABDOMEN 1 VIEW: CPT

## 2020-07-30 RX ORDER — FENTANYL CITRATE 50 UG/ML
50 INJECTION, SOLUTION INTRAMUSCULAR; INTRAVENOUS
Status: DISCONTINUED | OUTPATIENT
Start: 2020-07-30 | End: 2020-07-30 | Stop reason: HOSPADM

## 2020-07-30 RX ORDER — METOCLOPRAMIDE 10 MG/1
10 TABLET ORAL ONCE
Status: DISCONTINUED | OUTPATIENT
Start: 2020-07-30 | End: 2020-07-30 | Stop reason: HOSPADM

## 2020-07-30 RX ORDER — MORPHINE SULFATE 4 MG/ML
4 INJECTION, SOLUTION INTRAMUSCULAR; INTRAVENOUS
Status: DISCONTINUED | OUTPATIENT
Start: 2020-07-30 | End: 2020-07-30 | Stop reason: HOSPADM

## 2020-07-30 RX ORDER — DIPHENHYDRAMINE HYDROCHLORIDE 50 MG/ML
12.5 INJECTION INTRAMUSCULAR; INTRAVENOUS
Status: DISCONTINUED | OUTPATIENT
Start: 2020-07-30 | End: 2020-07-30 | Stop reason: HOSPADM

## 2020-07-30 RX ORDER — MORPHINE SULFATE 4 MG/ML
2 INJECTION, SOLUTION INTRAMUSCULAR; INTRAVENOUS EVERY 5 MIN PRN
Status: DISCONTINUED | OUTPATIENT
Start: 2020-07-30 | End: 2020-07-30 | Stop reason: HOSPADM

## 2020-07-30 RX ORDER — HYDROMORPHONE HYDROCHLORIDE 1 MG/ML
0.25 INJECTION, SOLUTION INTRAMUSCULAR; INTRAVENOUS; SUBCUTANEOUS EVERY 5 MIN PRN
Status: DISCONTINUED | OUTPATIENT
Start: 2020-07-30 | End: 2020-07-30 | Stop reason: HOSPADM

## 2020-07-30 RX ORDER — LABETALOL HYDROCHLORIDE 5 MG/ML
5 INJECTION, SOLUTION INTRAVENOUS EVERY 10 MIN PRN
Status: DISCONTINUED | OUTPATIENT
Start: 2020-07-30 | End: 2020-07-30 | Stop reason: HOSPADM

## 2020-07-30 RX ORDER — CIPROFLOXACIN 2 MG/ML
400 INJECTION, SOLUTION INTRAVENOUS ONCE
Status: COMPLETED | OUTPATIENT
Start: 2020-07-30 | End: 2020-07-30

## 2020-07-30 RX ORDER — PROPOFOL 10 MG/ML
INJECTION, EMULSION INTRAVENOUS PRN
Status: DISCONTINUED | OUTPATIENT
Start: 2020-07-30 | End: 2020-07-30 | Stop reason: SDUPTHER

## 2020-07-30 RX ORDER — DEXAMETHASONE SODIUM PHOSPHATE 10 MG/ML
INJECTION, SOLUTION INTRAMUSCULAR; INTRAVENOUS PRN
Status: DISCONTINUED | OUTPATIENT
Start: 2020-07-30 | End: 2020-07-30 | Stop reason: SDUPTHER

## 2020-07-30 RX ORDER — SCOLOPAMINE TRANSDERMAL SYSTEM 1 MG/1
1 PATCH, EXTENDED RELEASE TRANSDERMAL
Status: DISCONTINUED | OUTPATIENT
Start: 2020-07-30 | End: 2020-07-30 | Stop reason: HOSPADM

## 2020-07-30 RX ORDER — PROMETHAZINE HYDROCHLORIDE 25 MG/ML
6.25 INJECTION, SOLUTION INTRAMUSCULAR; INTRAVENOUS
Status: DISCONTINUED | OUTPATIENT
Start: 2020-07-30 | End: 2020-07-30 | Stop reason: HOSPADM

## 2020-07-30 RX ORDER — MEPERIDINE HYDROCHLORIDE 50 MG/1
50 TABLET ORAL EVERY 6 HOURS PRN
Qty: 12 TABLET | Refills: 0 | Status: SHIPPED | OUTPATIENT
Start: 2020-07-30 | End: 2020-08-02

## 2020-07-30 RX ORDER — MEPERIDINE HYDROCHLORIDE 50 MG/ML
50 INJECTION INTRAMUSCULAR; INTRAVENOUS; SUBCUTANEOUS EVERY 4 HOURS PRN
Status: DISCONTINUED | OUTPATIENT
Start: 2020-07-30 | End: 2020-07-30 | Stop reason: HOSPADM

## 2020-07-30 RX ORDER — SODIUM CHLORIDE, SODIUM LACTATE, POTASSIUM CHLORIDE, CALCIUM CHLORIDE 600; 310; 30; 20 MG/100ML; MG/100ML; MG/100ML; MG/100ML
INJECTION, SOLUTION INTRAVENOUS CONTINUOUS PRN
Status: DISCONTINUED | OUTPATIENT
Start: 2020-07-30 | End: 2020-07-30 | Stop reason: SDUPTHER

## 2020-07-30 RX ORDER — MEPERIDINE HYDROCHLORIDE 50 MG/1
50 TABLET ORAL EVERY 4 HOURS PRN
Status: DISCONTINUED | OUTPATIENT
Start: 2020-07-30 | End: 2020-07-30 | Stop reason: HOSPADM

## 2020-07-30 RX ORDER — METOCLOPRAMIDE HYDROCHLORIDE 5 MG/ML
10 INJECTION INTRAMUSCULAR; INTRAVENOUS
Status: DISCONTINUED | OUTPATIENT
Start: 2020-07-30 | End: 2020-07-30 | Stop reason: HOSPADM

## 2020-07-30 RX ORDER — FAMOTIDINE 20 MG/1
20 TABLET, FILM COATED ORAL
Status: DISCONTINUED | OUTPATIENT
Start: 2020-07-30 | End: 2020-07-30 | Stop reason: HOSPADM

## 2020-07-30 RX ORDER — HYDRALAZINE HYDROCHLORIDE 20 MG/ML
5 INJECTION INTRAMUSCULAR; INTRAVENOUS EVERY 10 MIN PRN
Status: DISCONTINUED | OUTPATIENT
Start: 2020-07-30 | End: 2020-07-30 | Stop reason: HOSPADM

## 2020-07-30 RX ORDER — ROCURONIUM BROMIDE 10 MG/ML
INJECTION, SOLUTION INTRAVENOUS PRN
Status: DISCONTINUED | OUTPATIENT
Start: 2020-07-30 | End: 2020-07-30 | Stop reason: SDUPTHER

## 2020-07-30 RX ORDER — ONDANSETRON 2 MG/ML
INJECTION INTRAMUSCULAR; INTRAVENOUS PRN
Status: DISCONTINUED | OUTPATIENT
Start: 2020-07-30 | End: 2020-07-30 | Stop reason: SDUPTHER

## 2020-07-30 RX ORDER — HYDROMORPHONE HYDROCHLORIDE 1 MG/ML
0.5 INJECTION, SOLUTION INTRAMUSCULAR; INTRAVENOUS; SUBCUTANEOUS EVERY 5 MIN PRN
Status: DISCONTINUED | OUTPATIENT
Start: 2020-07-30 | End: 2020-07-30 | Stop reason: HOSPADM

## 2020-07-30 RX ORDER — SODIUM CHLORIDE 0.9 % (FLUSH) 0.9 %
10 SYRINGE (ML) INJECTION EVERY 12 HOURS SCHEDULED
Status: DISCONTINUED | OUTPATIENT
Start: 2020-07-30 | End: 2020-07-30 | Stop reason: HOSPADM

## 2020-07-30 RX ORDER — SODIUM CHLORIDE, SODIUM LACTATE, POTASSIUM CHLORIDE, CALCIUM CHLORIDE 600; 310; 30; 20 MG/100ML; MG/100ML; MG/100ML; MG/100ML
INJECTION, SOLUTION INTRAVENOUS CONTINUOUS
Status: DISCONTINUED | OUTPATIENT
Start: 2020-07-30 | End: 2020-07-30 | Stop reason: HOSPADM

## 2020-07-30 RX ORDER — MORPHINE SULFATE 4 MG/ML
4 INJECTION, SOLUTION INTRAMUSCULAR; INTRAVENOUS EVERY 5 MIN PRN
Status: DISCONTINUED | OUTPATIENT
Start: 2020-07-30 | End: 2020-07-30 | Stop reason: HOSPADM

## 2020-07-30 RX ORDER — LIDOCAINE HYDROCHLORIDE 10 MG/ML
INJECTION, SOLUTION EPIDURAL; INFILTRATION; INTRACAUDAL; PERINEURAL PRN
Status: DISCONTINUED | OUTPATIENT
Start: 2020-07-30 | End: 2020-07-30 | Stop reason: SDUPTHER

## 2020-07-30 RX ORDER — FENTANYL CITRATE 50 UG/ML
INJECTION, SOLUTION INTRAMUSCULAR; INTRAVENOUS PRN
Status: DISCONTINUED | OUTPATIENT
Start: 2020-07-30 | End: 2020-07-30 | Stop reason: SDUPTHER

## 2020-07-30 RX ORDER — MEPERIDINE HYDROCHLORIDE 50 MG/ML
12.5 INJECTION INTRAMUSCULAR; INTRAVENOUS; SUBCUTANEOUS EVERY 5 MIN PRN
Status: DISCONTINUED | OUTPATIENT
Start: 2020-07-30 | End: 2020-07-30 | Stop reason: HOSPADM

## 2020-07-30 RX ORDER — ONDANSETRON 2 MG/ML
4 INJECTION INTRAMUSCULAR; INTRAVENOUS EVERY 4 HOURS PRN
Status: DISCONTINUED | OUTPATIENT
Start: 2020-07-30 | End: 2020-07-30 | Stop reason: HOSPADM

## 2020-07-30 RX ORDER — SODIUM CHLORIDE 0.9 % (FLUSH) 0.9 %
10 SYRINGE (ML) INJECTION PRN
Status: DISCONTINUED | OUTPATIENT
Start: 2020-07-30 | End: 2020-07-30 | Stop reason: HOSPADM

## 2020-07-30 RX ORDER — LIDOCAINE HYDROCHLORIDE 10 MG/ML
1 INJECTION, SOLUTION EPIDURAL; INFILTRATION; INTRACAUDAL; PERINEURAL
Status: DISCONTINUED | OUTPATIENT
Start: 2020-07-30 | End: 2020-07-30 | Stop reason: HOSPADM

## 2020-07-30 RX ORDER — SODIUM CHLORIDE 9 MG/ML
INJECTION, SOLUTION INTRAVENOUS CONTINUOUS
Status: DISCONTINUED | OUTPATIENT
Start: 2020-07-30 | End: 2020-07-30 | Stop reason: HOSPADM

## 2020-07-30 RX ORDER — EPHEDRINE SULFATE 50 MG/ML
INJECTION, SOLUTION INTRAVENOUS PRN
Status: DISCONTINUED | OUTPATIENT
Start: 2020-07-30 | End: 2020-07-30 | Stop reason: SDUPTHER

## 2020-07-30 RX ORDER — MIDAZOLAM HYDROCHLORIDE 1 MG/ML
2 INJECTION INTRAMUSCULAR; INTRAVENOUS
Status: DISCONTINUED | OUTPATIENT
Start: 2020-07-30 | End: 2020-07-30 | Stop reason: HOSPADM

## 2020-07-30 RX ORDER — APREPITANT 40 MG/1
40 CAPSULE ORAL ONCE
Status: DISCONTINUED | OUTPATIENT
Start: 2020-07-30 | End: 2020-07-30 | Stop reason: HOSPADM

## 2020-07-30 RX ADMIN — ROCURONIUM BROMIDE 50 MG: 10 INJECTION, SOLUTION INTRAVENOUS at 14:26

## 2020-07-30 RX ADMIN — EPHEDRINE SULFATE 5 MG: 50 INJECTION INTRAMUSCULAR; INTRAVENOUS; SUBCUTANEOUS at 14:47

## 2020-07-30 RX ADMIN — EPHEDRINE SULFATE 10 MG: 50 INJECTION INTRAMUSCULAR; INTRAVENOUS; SUBCUTANEOUS at 15:19

## 2020-07-30 RX ADMIN — FENTANYL CITRATE 50 MCG: 50 INJECTION INTRAMUSCULAR; INTRAVENOUS at 14:31

## 2020-07-30 RX ADMIN — PROPOFOL 150 MG: 10 INJECTION, EMULSION INTRAVENOUS at 14:31

## 2020-07-30 RX ADMIN — CIPROFLOXACIN 400 MG: 2 INJECTION, SOLUTION INTRAVENOUS at 14:41

## 2020-07-30 RX ADMIN — SODIUM CHLORIDE, SODIUM LACTATE, POTASSIUM CHLORIDE, AND CALCIUM CHLORIDE: 600; 310; 30; 20 INJECTION, SOLUTION INTRAVENOUS at 14:26

## 2020-07-30 RX ADMIN — SODIUM CHLORIDE, POTASSIUM CHLORIDE, SODIUM LACTATE AND CALCIUM CHLORIDE: 600; 310; 30; 20 INJECTION, SOLUTION INTRAVENOUS at 11:59

## 2020-07-30 RX ADMIN — SUGAMMADEX 200 MG: 100 INJECTION, SOLUTION INTRAVENOUS at 15:40

## 2020-07-30 RX ADMIN — MIDAZOLAM 2 MG: 1 INJECTION INTRAMUSCULAR; INTRAVENOUS at 14:24

## 2020-07-30 RX ADMIN — LIDOCAINE HYDROCHLORIDE 50 MG: 10 INJECTION, SOLUTION EPIDURAL; INFILTRATION; INTRACAUDAL; PERINEURAL at 14:31

## 2020-07-30 RX ADMIN — ONDANSETRON HYDROCHLORIDE 4 MG: 2 INJECTION, SOLUTION INTRAMUSCULAR; INTRAVENOUS at 15:41

## 2020-07-30 RX ADMIN — DEXAMETHASONE SODIUM PHOSPHATE 10 MG: 10 INJECTION, SOLUTION INTRAMUSCULAR; INTRAVENOUS at 14:40

## 2020-07-30 RX ADMIN — EPHEDRINE SULFATE 10 MG: 50 INJECTION INTRAMUSCULAR; INTRAVENOUS; SUBCUTANEOUS at 14:57

## 2020-07-30 ASSESSMENT — LIFESTYLE VARIABLES: SMOKING_STATUS: 0

## 2020-07-30 ASSESSMENT — PAIN DESCRIPTION - LOCATION
LOCATION: BACK
LOCATION: BACK

## 2020-07-30 ASSESSMENT — PAIN SCALES - GENERAL
PAINLEVEL_OUTOF10: 2
PAINLEVEL_OUTOF10: 2

## 2020-07-30 ASSESSMENT — PAIN - FUNCTIONAL ASSESSMENT: PAIN_FUNCTIONAL_ASSESSMENT: 0-10

## 2020-07-30 ASSESSMENT — ENCOUNTER SYMPTOMS: SHORTNESS OF BREATH: 0

## 2020-07-30 NOTE — ANESTHESIA POSTPROCEDURE EVALUATION
Department of Anesthesiology  Postprocedure Note    Patient: Elsy Kingsley  MRN: [id number]  YOB: 1965  Date of evaluation: 7/30/2020  Time:  3:58 PM     Procedure Summary     Date:  07/30/20 Room / Location:  [address]    Anesthesia Start:  2029 Anesthesia Stop:  6563    Procedure:  RIGHT EXTRACORPOREAL SHOCK WAVE LITHOTRIPSY (Right ) Diagnosis:  (N20.0)    Surgeon:  Mar Quintero MD Responsible Provider:  OSCAR Kelly CRNA    Anesthesia Type:  general ASA Status:  3          Anesthesia Type: general    Fifi Phase I: Fifi Score: 10    Fifi Phase II:      Last vitals: Reviewed and per EMR flowsheets.        Anesthesia Post Evaluation    Patient location during evaluation: PACU  Patient participation: complete - patient participated  Level of consciousness: awake and alert  Pain score: 0  Airway patency: patent  Nausea & Vomiting: no nausea and no vomiting  Complications: no  Cardiovascular status: blood pressure returned to baseline  Respiratory status: acceptable, spontaneous ventilation and room air  Hydration status: stable

## 2020-07-30 NOTE — H&P
Emmett Gonzalez is a 53 y.o. male who presents today   Chief Complaint   Patient presents with    Follow-up       patient following up on kidney stones. was scheduled for surgery back in March for this, but cancelled. I              HPI:         Emmett Gonzalez presents for follow-up nephrolithiasis. He was previously scheduled to undergo right ESWL, however elective procedures were canceled due to [place]. Last intervention was completed in October 2019 due to a left ureteral stone. Last imaging was completed in March and showed a right renal stone around 8.4 mm and a left renal stone around 4.5 mm.     Patient has a brother with renal cancer and a father with prostate cancer.   I have contacted genetic counselor to speak with the patient.     Patient would like to proceed with right renal ESWL at this time.     Past Medical History        Past Medical History:   Diagnosis Date    Arthrogryposis       congenital    GERD (gastroesophageal reflux disease)      Kidney stones      Migraines      Pulmonary embolus ([address]      Seizure ([address]      Thyroid disease       Graves disease         Past Surgical History         Past Surgical History:   Procedure Laterality Date    APPENDECTOMY        CYSTOSCOPY Left 10/8/2019     CYSTOSCOPY LEFT URETEROSCOPY LASER LITHOTRIPSY, STONE REMOVAL performed by Johanna Coombs MD at [address] Left 10/8/2019     PLACEMENT OF DOUBLE J STENT performed by Johanna Coombs MD at [facility]               Family History         Family History   Problem Relation Age of Onset    Malig Hypertherm Other             Social History            Tobacco Use    Smoking status: Former Smoker    Smokeless tobacco: Current User       Types: Snuff   Substance Use Topics    Alcohol use: Not Currently      Current Facility-Administered Medications          Current Outpatient Medications   Medication Sig Dispense Refill    zinc gluconate 50 MG tablet Take 50 mg by mouth daily        mirabegron (MYRBETRIQ) 25 MG TB24 Take 1 tablet by mouth daily 30 tablet 3    lisinopril (PRINIVIL;ZESTRIL) 10 MG tablet daily         pantoprazole (PROTONIX) 40 MG tablet daily         FLUoxetine (PROZAC) 20 MG capsule daily         D 5000 5000 units CAPS capsule daily         topiramate (TOPAMAX) 200 MG tablet daily Indications: migraines         tamsulosin (FLOMAX) 0.4 MG capsule daily           No current facility-administered medications for this visit. Allergies   Allergen Reactions    Codeine      Sulfa Antibiotics             Subjective:      Review of Systems   Constitutional: Negative for chills and fever. Genitourinary: Positive for flank pain. Negative for difficulty urinating, hematuria and urgency. All other systems reviewed and are negative.        Objective:      Physical Exam  Vitals signs reviewed. Constitutional:       General: He is not in acute distress. Appearance: He is well-developed. HENT:      Head: Normocephalic and atraumatic. Eyes:      Conjunctiva/sclera: Conjunctivae normal.      Pupils: Pupils are equal, round, and reactive to light. Neck:      Musculoskeletal: Normal range of motion and neck supple. Cardiovascular:      Rate and Rhythm: Normal rate. Pulmonary:      Effort: Pulmonary effort is normal. No respiratory distress. Abdominal:      Palpations: Abdomen is soft. Musculoskeletal: Normal range of motion. Skin:     General: Skin is warm and dry. Neurological:      Mental Status: He is alert and oriented to person, place, and time. Psychiatric:         Behavior: Behavior normal.         Thought Content:  Thought content normal.         Judgment: Judgment normal.         Temp 97.8 °F (36.6 °C) (Temporal)   Ht 5' 4\" (1.626 m)   Wt 187 lb (84.8 kg)   BMI 32.10 kg/m²         DATA:        Results for orders placed or performed in visit on 07/09/20   POCT Urinalysis No Micro (Auto)   Result Value Ref Range     Color, UA yellow       Clarity, UA clear       Glucose, UA POC neg       Bilirubin, UA neg       Ketones, UA neg       Spec Grav, UA 1.010       Blood, UA POC neg       pH, UA 7.0       Protein, UA POC neg       Urobilinogen, UA 0.2       Leukocytes, UA neg       Nitrite, UA neg             Lab Results   Component Value Date     PSA 1.67 11/21/2019           Assessment/ Plan       Diagnosis Orders   1. Right nephrolithiasis  POCT Urinalysis No Micro (Auto)     Patient will be placed on the schedule for right renal ESWL. Risks of shockwave lithotripsy were discussed with patient including infection, bleeding, injury to kidney or adjacent organs, renal hemorrhage or hematoma, possible need of stent and removal of stent; or repeat procedure as well as the anesthesia risks of MI or CVA.        Discussed use, benefit, and side effects of prescribed medications. All patient questions answered. Pt voiced understanding. Patient agreed with treatment plan.         Electronically signed by OSCAR Zheng on 7/10/2020 at 12:53 PM     EMR dragon/transcription disclaimer: Much of this encounter note is electronic transcription/translation of spoken language to printed tach. Electronic translation of spoken language may be erroneous, or at times, nonsensical words or phrases may be inadvertently transcribed.  Although, I have reviewed the note for such errors, some may still exist.

## 2020-07-30 NOTE — ADDENDUM NOTE
Addendum  created 07/30/20 1552 by OSCAR Clark CRNA    Intraprocedure Meds edited, Orders acknowledged in Narrator

## 2020-07-30 NOTE — ANESTHESIA PRE PROCEDURE
Department of Anesthesiology  Preprocedure Note       Name:  Geovanni Postal   Age:  53 y.o.  :  1965                                          MRN:  [id number]         Date:  2020      Surgeon: Baltazar Allen):  Levon Massey MD    Procedure: Procedure(s):  RIGHT EXTRACORPOREAL SHOCK WAVE LITHOTRIPSY    Medications prior to admission:   Prior to Admission medications    Medication Sig Start Date End Date Taking? Authorizing Provider   zinc gluconate 50 MG tablet Take 50 mg by mouth daily    Historical Provider, MD   lisinopril (PRINIVIL;ZESTRIL) 10 MG tablet Take 10 mg by mouth daily  19   Historical Provider, MD   pantoprazole (PROTONIX) 40 MG tablet Take 40 mg by mouth daily  19   Historical Provider, MD   FLUoxetine (PROZAC) 20 MG capsule Take 20 mg by mouth daily  19   Historical Provider, MD AOMS 5000 5000 units CAPS capsule Take 5,000 Units by mouth daily  19   Historical Provider, MD   topiramate (TOPAMAX) 200 MG tablet Take 200 mg by mouth daily Indications: migraines  19   Historical Provider, MD   tamsulosin (FLOMAX) 0.4 MG capsule Take 0.4 mg by mouth daily  19   Historical Provider, MD       Current medications:    Current Facility-Administered Medications   Medication Dose Route Frequency Provider Last Rate Last Dose    ciprofloxacin (CIPRO) IVPB 400 mg  400 mg Intravenous Once Levon Massey MD        scopolamine (TRANSDERM-SCOP) transdermal patch 1 patch  1 patch Transdermal Q72H Christel Tobin MD   1 patch at 20 1201    lactated ringers infusion   Intravenous Continuous Christel Tobin  mL/hr at 20 1159         Allergies:     Allergies   Allergen Reactions    Latex Rash    Sulfa Antibiotics Other (See Comments)     Extreme skin irritation and peeling of genital area    Codeine Rash       Problem List:    Patient Active Problem List   Diagnosis Code    Left ureteral calculus N20.1    Right nephrolithiasis N20.0       Past Medical History:        Diagnosis Date    Arthrogryposis     congenital    GERD (gastroesophageal reflux disease)     Hx of blood clots     Hypertension     Kidney stone     Kidney stones     Migraines     PONV (postoperative nausea and vomiting)     Pulmonary embolus ([address]     \"years ago\"    Seizure ([address]     hx of x1; weaned off meds per neurologist    Thyroid disease     Graves disease       Past Surgical History:        Procedure Laterality Date    APPENDECTOMY      CYSTOSCOPY Left 10/8/2019    CYSTOSCOPY LEFT URETEROSCOPY LASER LITHOTRIPSY, STONE REMOVAL performed by Elvira Simpson MD at [facility] 10/8/2019    PLACEMENT OF DOUBLE J STENT performed by Elvira Simpson MD at [address] [place] [address]   [address] [facility]       Social History:    Social History     Tobacco Use    Smoking status: Former Smoker    Smokeless tobacco: Current User     Types: Snuff    Tobacco comment: dips 1 can every 2 weeks   Substance Use Topics    Alcohol use: Not Currently                                Ready to quit: Not Answered  Counseling given: Not Answered  Comment: dips 1 can every 2 weeks      Vital Signs (Current):   Vitals:    07/30/20 1148   BP: 122/68   Pulse: 76   Resp: 16   Temp: 97.7 °F (36.5 °C)   TempSrc: Temporal   SpO2: 98%   Weight: 185 lb (83.9 kg)   Height: 5' 4\" (1.626 m)                                              BP Readings from Last 3 Encounters:   07/30/20 122/68   10/17/19 126/67   10/08/19 129/88       NPO Status: Time of last liquid consumption: 0800(sips of water)                                                 Date of last liquid consumption: 07/30/20                             BMI:   Wt Readings from Last 3 Encounters:   07/30/20 185 lb (83.9 kg)   07/27/20 183 lb (83 kg)   07/09/20 187 lb (84.8 kg)     Body mass index is 31.76 kg/m².     CBC:   Lab Results   Component Value Date    WBC 6.5 07/27/2020    RBC 4.54 07/27/2020    HGB 13.9 07/27/2020    HGB 13.1 03/27/2012    HGB 14.8 03/26/2012    HCT 42.3 07/27/2020    HCT 43.2 03/26/2012    MCV 93.2 07/27/2020    RDW 13.2 07/27/2020     07/27/2020     03/26/2012       CMP:   Lab Results   Component Value Date     07/27/2020     03/26/2012    K 3.9 07/27/2020     07/27/2020     03/26/2012    CO2 17 07/27/2020    BUN 21 07/27/2020    CREATININE 0.8 07/27/2020    CREATININE 0.7 03/26/2012    GFRAA >59 07/27/2020    LABGLOM >60 07/27/2020    GLUCOSE 121 07/27/2020    CALCIUM 9.1 07/27/2020       POC Tests: No results for input(s): POCGLU, POCNA, POCK, POCCL, POCBUN, POCHEMO, POCHCT in the last 72 hours. Coags:   Lab Results   Component Value Date    PROTIME 14.6 07/27/2020    PROTIME 11.40 03/26/2012    INR 1.14 07/27/2020    APTT 33.8 07/27/2020       HCG (If Applicable): No results found for: PREGTESTUR, PREGSERUM, HCG, HCGQUANT     ABGs: No results found for: PHART, PO2ART, ZMF1QNT, ARQ8SEA, BEART, C1TOWZCM     Type & Screen (If Applicable):  No results found for: LABABO, LABRH    Drug/Infectious Status (If Applicable):  No results found for: HIV, HEPCAB    COVID-19 Screening (If Applicable):   Lab Results   Component Value Date    COVID19 Not Detected 07/27/2020         Anesthesia Evaluation  Patient summary reviewed and Nursing notes reviewed   history of anesthetic complications: PONV. Airway: Mallampati: II  TM distance: >3 FB   Neck ROM: full  Mouth opening: > = 3 FB Dental: normal exam         Pulmonary:Negative Pulmonary ROS and normal exam  breath sounds clear to auscultation      (-) shortness of breath and not a current smoker          Patient did not smoke on day of surgery.                  Cardiovascular:    (+) hypertension:,     (-) CAD,  angina and  CHF    NYHA Classification: I  ECG reviewed  Rhythm: regular  Rate: normal           Beta Blocker:  Not on Beta Blocker         Neuro/Psych:   Negative Neuro/Psych ROS     (-) seizures, CVA and depression/anxiety            GI/Hepatic/Renal: Neg GI/Hepatic/Renal ROS  (+) GERD:, renal disease: kidney stones,      (-) hiatal hernia       Endo/Other: Negative Endo/Other ROS             Pt had PAT visit. Abdominal:       Abdomen: soft. Vascular:   + PE. Anesthesia Plan      general     ASA 3     (Due to high risk of ponv, will plan on a multimodal antiemetic regimen. (Scopalamine, emend, pepcid, zofran and perhaps decadron) unless contraindicated in this patient  )  Induction: intravenous. BIS  MIPS: Postoperative opioids intended and Prophylactic antiemetics administered. Anesthetic plan and risks discussed with patient. Use of blood products discussed with patient whom. Plan discussed with CRNA.     Attending anesthesiologist reviewed and agrees with Pre Eval content              Kacy Armijo MD   7/30/2020

## 2020-07-30 NOTE — OP NOTE
Brief operative Note      Patient: Hong Napoles  YOB: 1965  MRN: [id number]    Date of Procedure: 7/30/2020    Pre-Op Diagnosis: N20.0 right renal calculus    Post-Op Diagnosis:        Procedure(s):  RIGHT RENAL EXTRACORPOREAL SHOCK WAVE LITHOTRIPSY    Surgeon(s):  Stephanie Oliveros MD    Assistant:   * No surgical staff found *    Anesthesia: General    Estimated Blood Loss (mL): 0    Complications: None    Specimens:   * No specimens in log *    Implants:  * No implants in log *      Drains: * No LDAs found *    Findings: 3000 shockwaves power level to 8 there was incomplete fragmentation of the stone    Detailed Description of Procedure:   See dictated report: [id number]    Disposition patient will follow-up in 2 weeks with KUB    Electronically signed by Adali Nieves MD on 7/30/2020 at 3:44 PM

## 2020-07-31 ENCOUNTER — TELEPHONE (OUTPATIENT)
Dept: UROLOGY | Age: 55
End: 2020-07-31

## 2020-07-31 RX ORDER — MIDAZOLAM HYDROCHLORIDE 1 MG/ML
INJECTION INTRAMUSCULAR; INTRAVENOUS PRN
Status: DISCONTINUED | OUTPATIENT
Start: 2020-07-30 | End: 2020-07-31 | Stop reason: SDUPTHER

## 2020-07-31 NOTE — OP NOTE
[facility] [address] [facility]                                OPERATIVE REPORT    PATIENT NAME: Bi Davenport                 :        1965  MED REC NO:   [id number]                              ROOM:  ACCOUNT NO:   [de-identified]                           ADMIT DATE: 2020  PROVIDER:     Cathie Walker MD    DATE OF PROCEDURE:  2020    TITLE OF OPERATION:  Right renal ESWL. PREOPERATIVE DIAGNOSIS:  Right renal calculus. POSTOPERATIVE DIAGNOSIS:  Right renal calculus. ANESTHETIC:  General anesthetic. ATTENDING SURGEON:  Cathie Walker MD    HISTORY:  The patient is a 53-year-old gentleman who has a known  bilateral renal calculi. He has a larger 8 to 9 mm stone on the right  and a smaller 5 to 6 mm stone on the left. He was offered to have  elective ESWL and he wished to proceed. The risks and complications of  the procedure were discussed with the patient including the risk of  failure to fragment the stone and need for subsequent, adjuvant or  repeat procedures; perinephric hematoma or bleed from the kidney, injury  to adjacent organs. He understands and agrees to proceed. DESCRIPTION OF PROCEDURE:  The patient was brought to the operating room  and underwent general anesthetic. He was placed on the [facility]  lithotripsy machine in supine position and positioned for right renal  ESWL. Stone was easily seen in the focal point. Biplanar fluoroscopy  was used to localize the stone. The patient received 3000 shock waves  with a power level up to 8. The stone had minimal change and did appear  to look, maybe, a little dense, but there was very minimal change in the  configuration of the stone. Therefore, I felt there was incomplete  fragmentation. At the completion of 3000 shock waves, he was awakened  from his anesthetic and taken to the recovery room in stable condition.    He will follow up in 2 weeks

## 2020-07-31 NOTE — ADDENDUM NOTE
Addendum  created 07/31/20 0952 by OSCAR Lombardo CRNA    Intraprocedure Meds edited, Orders acknowledged in Narrator

## 2020-08-17 ENCOUNTER — OFFICE VISIT (OUTPATIENT)
Dept: UROLOGY | Age: 55
End: 2020-08-17
Payer: MEDICAID

## 2020-08-17 ENCOUNTER — HOSPITAL ENCOUNTER (OUTPATIENT)
Dept: GENERAL RADIOLOGY | Age: 55
Discharge: HOME | End: 2020-08-17
Payer: MEDICAID

## 2020-08-17 VITALS
HEIGHT: 64 IN | WEIGHT: 187 LBS | HEART RATE: 96 BPM | DIASTOLIC BLOOD PRESSURE: 88 MMHG | SYSTOLIC BLOOD PRESSURE: 129 MMHG | TEMPERATURE: 97.5 F | BODY MASS INDEX: 31.92 KG/M2

## 2020-08-17 PROBLEM — N20.0 RENAL CALCULUS, LEFT: Status: ACTIVE | Noted: 2020-08-17

## 2020-08-17 LAB
APPEARANCE FLUID: CLEAR
BILIRUBIN, POC: NORMAL
BLOOD URINE, POC: NORMAL
CLARITY, POC: CLEAR
COLOR, POC: YELLOW
GLUCOSE URINE, POC: NORMAL
KETONES, POC: NORMAL
LEUKOCYTE EST, POC: NORMAL
NITRITE, POC: NORMAL
PH, POC: 6.5
PROTEIN, POC: NORMAL
SPECIFIC GRAVITY, POC: 1.02
UROBILINOGEN, POC: 0.2

## 2020-08-17 PROCEDURE — 74018 RADEX ABDOMEN 1 VIEW: CPT

## 2020-08-17 PROCEDURE — 81002 URINALYSIS NONAUTO W/O SCOPE: CPT | Performed by: UROLOGY

## 2020-08-17 PROCEDURE — 99024 POSTOP FOLLOW-UP VISIT: CPT | Performed by: UROLOGY

## 2020-08-17 ASSESSMENT — ENCOUNTER SYMPTOMS
DIARRHEA: 0
SHORTNESS OF BREATH: 0
NAUSEA: 0
COLOR CHANGE: 0
SORE THROAT: 0
COUGH: 0
VOMITING: 0
BACK PAIN: 0
EYE REDNESS: 0
ABDOMINAL PAIN: 0
FACIAL SWELLING: 0
ABDOMINAL DISTENTION: 0
CONSTIPATION: 0
RHINORRHEA: 0
SINUS PRESSURE: 0
EYE DISCHARGE: 0
EYE PAIN: 0
BLOOD IN STOOL: 0
WHEEZING: 0

## 2021-04-14 ENCOUNTER — HOSPITAL ENCOUNTER (OUTPATIENT)
Dept: GENERAL RADIOLOGY | Age: 56
Discharge: HOME | End: 2021-04-14
Payer: MEDICAID

## 2021-04-14 ENCOUNTER — OFFICE VISIT (OUTPATIENT)
Dept: UROLOGY | Age: 56
End: 2021-04-14
Payer: MEDICAID

## 2021-04-14 VITALS — TEMPERATURE: 96.7 F | BODY MASS INDEX: 31.76 KG/M2 | WEIGHT: 186 LBS | HEIGHT: 64 IN

## 2021-04-14 DIAGNOSIS — N40.1 HYPERTROPHY OF PROSTATE WITH URINARY OBSTRUCTION: Primary | ICD-10-CM

## 2021-04-14 DIAGNOSIS — N20.0 RIGHT NEPHROLITHIASIS: ICD-10-CM

## 2021-04-14 DIAGNOSIS — N13.8 HYPERTROPHY OF PROSTATE WITH URINARY OBSTRUCTION: Primary | ICD-10-CM

## 2021-04-14 DIAGNOSIS — N20.0 BILATERAL NEPHROLITHIASIS: ICD-10-CM

## 2021-04-14 LAB
BACTERIA URINE, POC: ABNORMAL
BILIRUBIN URINE: 0 MG/DL
BLOOD, URINE: NEGATIVE
CASTS URINE, POC: ABNORMAL
CLARITY: CLEAR
COLOR: YELLOW
CRYSTALS URINE, POC: ABNORMAL
EPI CELLS URINE, POC: ABNORMAL
GLUCOSE URINE: ABNORMAL
KETONES, URINE: NEGATIVE
LEUKOCYTE EST, POC: ABNORMAL
NITRITE, URINE: NEGATIVE
PH UA: 6.5 (ref 4.5–8)
PROTEIN UA: NEGATIVE
RBC URINE, POC: ABNORMAL
SPECIFIC GRAVITY UA: 1.02 (ref 1–1.03)
UROBILINOGEN, URINE: NORMAL
WBC URINE, POC: 2
YEAST URINE, POC: ABNORMAL

## 2021-04-14 PROCEDURE — 74018 RADEX ABDOMEN 1 VIEW: CPT

## 2021-04-14 PROCEDURE — 81000 URINALYSIS NONAUTO W/SCOPE: CPT | Performed by: UROLOGY

## 2021-04-14 PROCEDURE — 99214 OFFICE O/P EST MOD 30 MIN: CPT | Performed by: UROLOGY

## 2021-04-14 ASSESSMENT — ENCOUNTER SYMPTOMS
VOMITING: 0
EYE DISCHARGE: 0
BACK PAIN: 0
CONSTIPATION: 0
DIARRHEA: 0
NAUSEA: 0
SHORTNESS OF BREATH: 0
EYE REDNESS: 0
TROUBLE SWALLOWING: 0
ABDOMINAL DISTENTION: 0
ABDOMINAL PAIN: 0
COUGH: 0

## 2021-04-14 NOTE — PROGRESS NOTES
Paulo Salgado is a 63 y.o. male who presents today   Chief Complaint   Patient presents with    Follow-up     I am here today for a follow up for renal stones-had KUB     Kidney calculus:  Patient is here today for a kidney calculus which was first noted  year(s) ago. Patient has known bilateral renal calculi. He is here for follow-up. Location: Bilateral upper and lower pole  Size: 45 mm  Recently the renal calculus symptoms: show no change  Current medical Rx for renal calculus: none  Past surgical procedures most recently underwent ESWL for 8 mm right renal calculus 7/30/2020 he has had other multiple procedures for stone  Flank pain? Yes, right; but also has had degenerative back disease  Hematuria? Negative  Passed recent calculus? No  Personal History of Kidney Stones: yes -    Stone composition: unknown  Family History of Kidney Stones: Unknown    Lab Results   Component Value Date    CALCIUM 9.1 07/27/2020     Lab Results   Component Value Date     07/27/2020    K 3.9 07/27/2020     07/27/2020    CO2 17 (L) 07/27/2020    BUN 21 (H) 07/27/2020    CREATININE 0.8 07/27/2020       Benign Prostatic Hypertrophy  Patient complains of lower urinary tract symptoms. He reports nocturia one time a night. He denies frequency, straining and weak stream. Patient states symptoms are of mild severity. Onset of symptoms was a few years ago and was gradual in onset. His AUA Symptom Score is, 1/35 manifested as irritative symptoms including nocturia. He has no personal history and no family history of prostate cancer. He reports a history of kidney stones. He denies gross hematuria and recurrent UTI.       Past Medical History:   Diagnosis Date    Arthrogryposis     congenital    GERD (gastroesophageal reflux disease)     Hx of blood clots     Hypertension     Kidney stone     Kidney stones     Migraines     PONV (postoperative nausea and vomiting)     Pulmonary embolus ([address]     \"years ago\"    Seizure ([address]     hx of x1; weaned off meds per neurologist    Thyroid disease     Graves disease       Past Surgical History:   Procedure Laterality Date    APPENDECTOMY      CYSTOSCOPY Left 10/8/2019    CYSTOSCOPY LEFT URETEROSCOPY LASER LITHOTRIPSY, STONE REMOVAL performed by Olaf Hines MD at [facility] Left 10/8/2019    PLACEMENT OF DOUBLE J STENT performed by Olaf Hines MD at [facility]   [address] [facility]      rtk    LITHOTRIPSY Right 7/30/2020    RIGHT EXTRACORPOREAL SHOCK WAVE LITHOTRIPSY performed by Romina Laurent MD at [facility] OR       Current Outpatient Medications   Medication Sig Dispense Refill    lisinopril (PRINIVIL;ZESTRIL) 10 MG tablet Take 10 mg by mouth daily       pantoprazole (PROTONIX) 40 MG tablet Take 40 mg by mouth daily       FLUoxetine (PROZAC) 20 MG capsule Take 20 mg by mouth daily       D 5000 5000 units CAPS capsule Take 5,000 Units by mouth daily       topiramate (TOPAMAX) 200 MG tablet Take 200 mg by mouth daily Indications: migraines       tamsulosin (FLOMAX) 0.4 MG capsule Take 0.4 mg by mouth daily       zinc gluconate 50 MG tablet Take 50 mg by mouth daily       No current facility-administered medications for this visit.         Allergies   Allergen Reactions    Latex Rash    Sulfa Antibiotics Other (See Comments)     Extreme skin irritation and peeling of genital area    Codeine Rash       Social History     Socioeconomic History    Marital status:      Spouse name: None    Number of children: None    Years of education: None    Highest education level: None   Occupational History    None   Social Needs    Financial resource strain: None    Food insecurity     Worry: None     Inability: None    Transportation needs     Medical: None     Non-medical: None   Tobacco Use    Smoking status: Former Smoker    Smokeless tobacco: Current User     Types: Snuff    Tobacco comment: dips 1 can every 2 weeks   Substance and Sexual Activity    Alcohol use: Not Currently    Drug use: Never    Sexual activity: Not Currently   Lifestyle    Physical activity     Days per week: None     Minutes per session: None    Stress: None   Relationships    Social connections     Talks on phone: None     Gets together: None     Attends Yarsani service: None     Active member of club or organization: None     Attends meetings of clubs or organizations: None     Relationship status: None    Intimate partner violence     Fear of current or ex partner: None     Emotionally abused: None     Physically abused: None     Forced sexual activity: None   Other Topics Concern    None   Social History Narrative    None       Family History   Problem Relation Age of Onset    Cancer Brother         ureter       REVIEW OF SYSTEMS:  Review of Systems   Constitutional: Negative for chills, fatigue and fever. HENT: Negative for congestion, ear pain and trouble swallowing. Eyes: Negative for discharge and redness. Respiratory: Negative for cough and shortness of breath. Cardiovascular: Negative for chest pain. Gastrointestinal: Negative for abdominal distention, abdominal pain, constipation, diarrhea, nausea and vomiting. Endocrine: Negative for cold intolerance and heat intolerance. Genitourinary: Negative for difficulty urinating, dysuria, flank pain, frequency, hematuria and urgency. Musculoskeletal: Negative for back pain and gait problem. Skin: Negative for pallor and wound. Allergic/Immunologic: Negative for environmental allergies and immunocompromised state. Neurological: Negative for dizziness and weakness. Hematological: Negative for adenopathy. Does not bruise/bleed easily. Psychiatric/Behavioral: Negative for agitation and confusion.          PHYSICAL EXAM:  Temp 96.7 °F (35.9 °C) (Temporal)   Ht 5' 4\" (1.626 m)   Wt 186 lb (84.4 kg)   BMI 31.93 kg/m²   Physical Exam  Constitutional: probably more from his degenerative spine disease as I see no change in his stones will have him follow-up in 6 months or sooner should he develop symptoms      Orders Placed This Encounter   Procedures    PSA, Diagnostic     PSA in 6 month     Standing Status:   Future     Standing Expiration Date:   4/14/2022        Return in about 6 months (around 10/14/2021) for PSA prior to vext visit. All information inputted into the note by the MA to include chief complaint, past medical history, past surgical history, medications, allergies, social and family history and review of systems has been reviewed and updated as needed by me. EMR Dragon/transcription disclaimer: Much of this documentt is electronic  transcription/translation of spoken language to printed text. The  electronic translation of spoken language may be erroneous, or at times,  nonsensical words or phrases may be inadvertently transcribed.  Although I  have reviewed the document for such errors, some may still exist.

## 2021-10-14 ENCOUNTER — OFFICE VISIT (OUTPATIENT)
Dept: UROLOGY | Age: 56
End: 2021-10-14
Payer: MEDICAID

## 2021-10-14 VITALS — WEIGHT: 197 LBS | HEIGHT: 64 IN | BODY MASS INDEX: 33.63 KG/M2

## 2021-10-14 DIAGNOSIS — N20.0 BILATERAL NEPHROLITHIASIS: ICD-10-CM

## 2021-10-14 DIAGNOSIS — N40.1 HYPERTROPHY OF PROSTATE WITH URINARY OBSTRUCTION: Primary | ICD-10-CM

## 2021-10-14 DIAGNOSIS — R97.20 ELEVATED PSA: ICD-10-CM

## 2021-10-14 DIAGNOSIS — N13.8 HYPERTROPHY OF PROSTATE WITH URINARY OBSTRUCTION: Primary | ICD-10-CM

## 2021-10-14 PROBLEM — N20.1 LEFT URETERAL CALCULUS: Status: RESOLVED | Noted: 2019-10-07 | Resolved: 2021-10-14

## 2021-10-14 PROCEDURE — 99214 OFFICE O/P EST MOD 30 MIN: CPT | Performed by: UROLOGY

## 2021-10-14 ASSESSMENT — ENCOUNTER SYMPTOMS
FACIAL SWELLING: 0
WHEEZING: 0
VOMITING: 0
SORE THROAT: 0
BACK PAIN: 0
NAUSEA: 0
CHEST TIGHTNESS: 0
EYE DISCHARGE: 0
EYE REDNESS: 0

## 2021-10-14 NOTE — PROGRESS NOTES
Shonna Rosenthal is a 61 y.o. male who presents today   Chief Complaint   Patient presents with    Follow-up     I am here for a 6 month FU. I had my psa done prior. Elevated PSA:  Patient is here today for history of an elevated PSA. His last several PSA values are as follows:  Lab Results   Component Value Date    PSA 1.67 11/21/2019   Patient had a PSA for follow-up of BPH symptoms done at [facility] on 10/5/2021. This showed PSA to be 5.1 free percent was 20%. Patient does have a family history of prostate cancer. Overall the PSA level is: increased  Recent history of urinary tract infection/prostatitis? no  Previous prostate biopsy? no  Lower urinary tract symptoms: nocturia, 1 times per night  Patient is on tamsulosin. Reports no change or problems with his urination. Kidney calculus:  Patient is here today for a kidney calculus which was first noted  year(s) ago. Patient has known bilateral renal calculi. He is here for follow-up. Location: Bilateral upper and lower pole  Size: 45 mm  Recently the renal calculus symptoms: show no change  Current medical Rx for renal calculus: none  Past surgical procedures most recently underwent ESWL for 8 mm right renal calculus 7/30/2020 he has had other multiple procedures for stone  Flank pain? He reports none today. Since last being seen he says he has had no symptoms of kidney stones  Hematuria? Negative  Passed recent calculus?  No  Personal History of Kidney Stones: yes -    Stone composition: unknown  Family History of Kidney Stones: Unknown      Past Medical History:   Diagnosis Date    Arthrogryposis     congenital    GERD (gastroesophageal reflux disease)     Hx of blood clots     Hypertension     Kidney stone     Kidney stones     Migraines     PONV (postoperative nausea and vomiting)     Pulmonary embolus ([address]     \"years ago\"    Seizure ([address]     hx of x1; weaned off meds per neurologist    Thyroid disease Graves disease       Past Surgical History:   Procedure Laterality Date    APPENDECTOMY      CYSTOSCOPY Left 10/8/2019    CYSTOSCOPY LEFT URETEROSCOPY LASER LITHOTRIPSY, STONE REMOVAL performed by Micky Ariza MD at [facility] Left 10/8/2019    PLACEMENT OF DOUBLE J STENT performed by Micky Ariza MD at [facility]   [facility]      rtk    LITHOTRIPSY Right 7/30/2020    RIGHT EXTRACORPOREAL SHOCK WAVE LITHOTRIPSY performed by Vasquez Kerr MD at [facility] OR       Current Outpatient Medications   Medication Sig Dispense Refill    zinc gluconate 50 MG tablet Take 50 mg by mouth daily      lisinopril (PRINIVIL;ZESTRIL) 10 MG tablet Take 10 mg by mouth daily       pantoprazole (PROTONIX) 40 MG tablet Take 40 mg by mouth daily       FLUoxetine (PROZAC) 20 MG capsule Take 20 mg by mouth daily       D 5000 5000 units CAPS capsule Take 5,000 Units by mouth daily       topiramate (TOPAMAX) 200 MG tablet Take 200 mg by mouth daily Indications: migraines       tamsulosin (FLOMAX) 0.4 MG capsule Take 0.4 mg by mouth daily        No current facility-administered medications for this visit.        Allergies   Allergen Reactions    Latex Rash    Sulfa Antibiotics Other (See Comments)     Extreme skin irritation and peeling of genital area    Codeine Rash       Social History     Socioeconomic History    Marital status:      Spouse name: None    Number of children: None    Years of education: None    Highest education level: None   Occupational History    None   Tobacco Use    Smoking status: Former Smoker    Smokeless tobacco: Current User     Types: Snuff    Tobacco comment: dips 1 can every 2 weeks   Substance and Sexual Activity    Alcohol use: Not Currently    Drug use: Never    Sexual activity: Not Currently   Other Topics Concern    None   Social History Narrative    None     Social Determinants of Health     Financial Resource Strain:     Difficulty of Paying Living Expenses:    Food Insecurity:     Worried About Running Out of Food in the Last Year:     [address] in the Last Year:    Transportation Needs:     Lack of Transportation (Medical):  Lack of Transportation (Non-Medical):    Physical Activity:     Days of Exercise per Week:     Minutes of Exercise per Session:    Stress:     Feeling of Stress :    Social Connections:     Frequency of Communication with Friends and Family:     Frequency of Social Gatherings with Friends and Family:     Attends Congregational Services:     Active Member of Clubs or Organizations:     Attends Club or Organization Meetings:     Marital Status:    Intimate Partner Violence:     Fear of Current or Ex-Partner:     Emotionally Abused:     Physically Abused:     Sexually Abused:        Family History   Problem Relation Age of Onset    Cancer Brother         ureter       REVIEW OF SYSTEMS:  Review of Systems   Constitutional: Negative for chills and fever. HENT: Negative for facial swelling and sore throat. Eyes: Negative for discharge and redness. Respiratory: Negative for chest tightness and wheezing. Cardiovascular: Negative for chest pain and palpitations. Gastrointestinal: Negative for nausea and vomiting. Endocrine: Negative for polyphagia and polyuria. Genitourinary: Negative for decreased urine volume, difficulty urinating, discharge, dysuria, enuresis, flank pain, frequency, genital sores, hematuria, penile pain, penile swelling, scrotal swelling, testicular pain and urgency. Musculoskeletal: Negative for back pain and neck stiffness. Skin: Negative for rash and wound. Neurological: Negative for dizziness and headaches. Hematological: Negative for adenopathy. Does not bruise/bleed easily. Psychiatric/Behavioral: Negative for confusion and hallucinations.          PHYSICAL EXAM:  Ht 5' 4\" (1.626 m)   Wt 197 lb (89.4 kg)   BMI 33.81 kg/m²   Physical Exam  Constitutional:       General: He is not in acute distress. Appearance: Normal appearance. He is well-developed. HENT:      Head: Normocephalic and atraumatic. Nose: Nose normal.   Eyes:      General: No scleral icterus. Conjunctiva/sclera: Conjunctivae normal.      Pupils: Pupils are equal, round, and reactive to light. Neck:      Trachea: No tracheal deviation. Cardiovascular:      Rate and Rhythm: Normal rate and regular rhythm. Pulmonary:      Effort: Pulmonary effort is normal. No respiratory distress. Breath sounds: No stridor. Abdominal:      General: There is no distension. Palpations: Abdomen is soft. There is no mass. Tenderness: There is no abdominal tenderness. Musculoskeletal:         General: No tenderness. Normal range of motion. Cervical back: Normal range of motion and neck supple. Lymphadenopathy:      Cervical: No cervical adenopathy. Skin:     General: Skin is warm and dry. Findings: No erythema. Neurological:      Mental Status: He is alert and oriented to person, place, and time. Psychiatric:         Behavior: Behavior normal.         Judgment: Judgment normal.             DATA:    No results found for this visit on 10/14/21. Lab Results   Component Value Date    PSA 1.67 11/21/2019         PSA done outside lab [facility] on 10/5/2021 was 5.1 ng/ML    IMAGING:  No repeat imaging done for this follow-up. 1. Elevated PSA  PSA is significantly elevated above his age-adjusted cut off compared to his last PSA in 2019. We will repeat this in 6 weeks if this is not back down below 3.5 he will need a biopsy particular given his positive family history  - PSA, Total and Free; Future    2. Hypertrophy of prostate with urinary obstruction  He describes minimal symptoms he is taking tamsulosin    3. Bilateral nephrolithiasis  Known nonobstructing stones bilaterally he denies any symptoms or problems since last visit.   We will treat this expectantly      Orders Placed This Encounter   Procedures    PSA, Total and Free     Prior to next visit     Standing Status:   Future     Standing Expiration Date:   10/14/2022        Return in about 6 weeks (around 11/25/2021) for PSA prior to vext visit. All information inputted into the note by the MA to include chief complaint, past medical history, past surgical history, medications, allergies, social and family history and review of systems has been reviewed and updated as needed by me. EMR Dragon/transcription disclaimer: Much of this documentt is electronic  transcription/translation of spoken language to printed text. The  electronic translation of spoken language may be erroneous, or at times,  nonsensical words or phrases may be inadvertently transcribed.  Although I  have reviewed the document for such errors, some may still exist.

## 2021-11-24 ENCOUNTER — TELEPHONE (OUTPATIENT)
Dept: UROLOGY | Age: 56
End: 2021-11-24

## 2021-11-24 NOTE — TELEPHONE ENCOUNTER
Tried to reach out to the patient regarding his appt on Monday. He needs a new PSA free and total done and this is a send out labs, he needs to get it either today 11/23 or Friday at the latest. I tried to call his cell phone number and it was giving a busy tone so I called and left his wife a message on her VM.

## 2021-11-29 ENCOUNTER — OFFICE VISIT (OUTPATIENT)
Dept: UROLOGY | Age: 56
End: 2021-11-29
Payer: MEDICAID

## 2021-11-29 VITALS — HEIGHT: 64 IN | BODY MASS INDEX: 33.8 KG/M2 | WEIGHT: 198 LBS

## 2021-11-29 DIAGNOSIS — R97.20 ELEVATED PSA: Primary | ICD-10-CM

## 2021-11-29 LAB
APPEARANCE FLUID: CLEAR
BILIRUBIN, POC: NORMAL
BLOOD URINE, POC: NORMAL
CLARITY, POC: CLEAR
COLOR, POC: YELLOW
GLUCOSE URINE, POC: NORMAL
KETONES, POC: NORMAL
LEUKOCYTE EST, POC: NORMAL
NITRITE, POC: NORMAL
PH, POC: 6.58
PROTEIN, POC: NORMAL
SPECIFIC GRAVITY, POC: 1.02
UROBILINOGEN, POC: 0.2

## 2021-11-29 PROCEDURE — 99213 OFFICE O/P EST LOW 20 MIN: CPT | Performed by: UROLOGY

## 2021-11-29 PROCEDURE — 81002 URINALYSIS NONAUTO W/O SCOPE: CPT | Performed by: UROLOGY

## 2021-11-29 ASSESSMENT — ENCOUNTER SYMPTOMS
FACIAL SWELLING: 0
CHEST TIGHTNESS: 0
VOMITING: 0
EYE REDNESS: 0
WHEEZING: 0
BACK PAIN: 0
NAUSEA: 0
SORE THROAT: 0
EYE DISCHARGE: 0

## 2021-11-29 NOTE — PROGRESS NOTES
Thea Bullock is a 61 y.o. male who presents today   Chief Complaint   Patient presents with    Follow-up     I am here for a 6 week fu. I had my PSA done prior. Elevated PSA:  Patient is here today for history of an elevated PSA.   His last several PSA values are as follows:  Lab Results   Component Value Date    PSA 1.67 11/21/2019     Most recent PSA done a [facility] was 3.4 done on 11/19/2021  PSA done at [facility] was 5.1 done on 10/5/2021    Overall the PSA level is: decreased  Recent history of urinary tract infection/prostatitis? no  Previous prostate biopsy? no  Lower urinary tract symptoms: nocturia, 1 times per night  Patient just here 6 weeks ago he is here for follow-up PSA    Past Medical History:   Diagnosis Date    Arthrogryposis     congenital    GERD (gastroesophageal reflux disease)     Hx of blood clots     Hypertension     Kidney stone     Kidney stones     Migraines     PONV (postoperative nausea and vomiting)     Pulmonary embolus ([address]     \"years ago\"    Seizure ([address]     hx of x1; weaned off meds per neurologist    Thyroid disease     Graves disease       Past Surgical History:   Procedure Laterality Date    APPENDECTOMY      CYSTOSCOPY Left 10/8/2019    CYSTOSCOPY LEFT URETEROSCOPY LASER LITHOTRIPSY, STONE REMOVAL performed by Waldo Gay MD at [facility] Left 10/8/2019    PLACEMENT OF DOUBLE J STENT performed by Waldo Gay MD at [facility]   [address] [place] [address]      rtk    LITHOTRIPSY Right 7/30/2020    RIGHT EXTRACORPOREAL SHOCK WAVE LITHOTRIPSY performed by Jeny Jenkins MD at [facility] OR       Current Outpatient Medications   Medication Sig Dispense Refill    Cyclobenzaprine HCl (FLEXERIL PO) Take 7.5 mg by mouth      zinc gluconate 50 MG tablet Take 50 mg by mouth daily      lisinopril (PRINIVIL;ZESTRIL) 10 MG tablet Take 10 mg by mouth daily       pantoprazole (PROTONIX) 40 MG tablet Take 40 mg by mouth daily       FLUoxetine (PROZAC) 20 MG capsule Take 20 mg by mouth daily       D 5000 5000 units CAPS capsule Take 5,000 Units by mouth daily       topiramate (TOPAMAX) 200 MG tablet Take 200 mg by mouth daily Indications: migraines       tamsulosin (FLOMAX) 0.4 MG capsule Take 0.4 mg by mouth daily        No current facility-administered medications for this visit. Allergies   Allergen Reactions    Latex Rash    Sulfa Antibiotics Other (See Comments)     Extreme skin irritation and peeling of genital area    Codeine Rash       Social History     Socioeconomic History    Marital status:      Spouse name: None    Number of children: None    Years of education: None    Highest education level: None   Occupational History    None   Tobacco Use    Smoking status: Former Smoker    Smokeless tobacco: Current User     Types: Snuff    Tobacco comment: dips 1 can every 2 weeks   Substance and Sexual Activity    Alcohol use: Not Currently    Drug use: Never    Sexual activity: Not Currently   Other Topics Concern    None   Social History Narrative    None     Social Determinants of Health     Financial Resource Strain:     Difficulty of Paying Living Expenses: Not on file   Food Insecurity:     Worried About Running Out of Food in the Last Year: Not on file    Robert of Food in the Last Year: Not on file   Transportation Needs:     Lack of Transportation (Medical): Not on file    Lack of Transportation (Non-Medical):  Not on file   Physical Activity:     Days of Exercise per Week: Not on file    Minutes of Exercise per Session: Not on file   Stress:     Feeling of Stress : Not on file   Social Connections:     Frequency of Communication with Friends and Family: Not on file    Frequency of Social Gatherings with Friends and Family: Not on file    Attends Latter day Services: Not on file    Active Member of Clubs or Organizations: Not on file   [address] [place] [address] Attends Club or Organization Meetings: Not on file    Marital Status: Not on file   Intimate Partner Violence:     Fear of Current or Ex-Partner: Not on file    Emotionally Abused: Not on file    Physically Abused: Not on file    Sexually Abused: Not on file   Housing Stability:     Unable to Pay for Housing in the Last Year: Not on file    Number of [place] in the Last Year: Not on file    Unstable Housing in the Last Year: Not on file       Family History   Problem Relation Age of Onset    Cancer Brother         ureter       REVIEW OF SYSTEMS:  Review of Systems   Constitutional: Negative for chills and fever. HENT: Negative for facial swelling and sore throat. Eyes: Negative for discharge and redness. Respiratory: Negative for chest tightness and wheezing. Cardiovascular: Negative for chest pain and palpitations. Gastrointestinal: Negative for nausea and vomiting. Endocrine: Negative for polyphagia and polyuria. Genitourinary: Negative for decreased urine volume, difficulty urinating, dysuria, enuresis, flank pain, frequency, genital sores, hematuria, penile discharge, penile pain, penile swelling, scrotal swelling, testicular pain and urgency. Musculoskeletal: Negative for back pain and neck stiffness. Skin: Negative for rash and wound. Neurological: Negative for dizziness and headaches. Hematological: Negative for adenopathy. Does not bruise/bleed easily. Psychiatric/Behavioral: Negative for confusion and hallucinations. PHYSICAL EXAM:  Ht 5' 4\" (1.626 m)   Wt 198 lb (89.8 kg)   BMI 33.99 kg/m²   Physical Exam  Constitutional:       General: He is not in acute distress. Appearance: Normal appearance. He is well-developed. HENT:      Head: Normocephalic and atraumatic. Nose: Nose normal.   Eyes:      General: No scleral icterus. Conjunctiva/sclera: Conjunctivae normal.      Pupils: Pupils are equal, round, and reactive to light.    Neck: Trachea: No tracheal deviation. Cardiovascular:      Rate and Rhythm: Normal rate and regular rhythm. Pulmonary:      Effort: Pulmonary effort is normal. No respiratory distress. Breath sounds: No stridor. Abdominal:      General: There is no distension. Palpations: Abdomen is soft. There is no mass. Tenderness: There is no abdominal tenderness. Musculoskeletal:         General: No tenderness. Normal range of motion. Cervical back: Normal range of motion and neck supple. Lymphadenopathy:      Cervical: No cervical adenopathy. Skin:     General: Skin is warm and dry. Findings: No erythema. Neurological:      Mental Status: He is alert and oriented to person, place, and time. Gait: Gait abnormal.   Psychiatric:         Behavior: Behavior normal.         Judgment: Judgment normal.             DATA:    Results for orders placed or performed in visit on 11/29/21   POCT Urinalysis no Micro   Result Value Ref Range    Color, UA yellow     Clarity, UA clear     Glucose, UA POC neg     Bilirubin, UA neg     Ketones, UA neg     Spec Grav, UA 1.025     Blood, UA POC neg     pH, UA 6.58     Protein, UA POC neg     Urobilinogen, UA 0.2     Leukocytes, UA neg     Nitrite, UA neg     Appearance, Fluid Clear Clear, Slightly Cloudy     Lab Results   Component Value Date    PSA 1.67 11/21/2019     No results found for: PSAFREEPCT    PSA done at [facility] on 11/19/2021 was 3.4 ng/ML        1. Elevated PSA  PSA is back down we will continue to monitor close  - PSA, Diagnostic; Future  - POCT Urinalysis no Micro      Orders Placed This Encounter   Procedures    PSA, Diagnostic     PSA in 6 month     Standing Status:   Future     Standing Expiration Date:   11/29/2022    POCT Urinalysis no Micro        Return in about 6 months (around 5/29/2022) for PSA prior to vext visit.     All information inputted into the note by the MA to include chief complaint, past medical history, past surgical history, medications, allergies, social and family history and review of systems has been reviewed and updated as needed by me. EMR Dragon/transcription disclaimer: Much of this documentt is electronic  transcription/translation of spoken language to printed text. The  electronic translation of spoken language may be erroneous, or at times,  nonsensical words or phrases may be inadvertently transcribed.  Although I  have reviewed the document for such errors, some may still exist.

## 2022-06-02 ENCOUNTER — OFFICE VISIT (OUTPATIENT)
Dept: UROLOGY | Age: 57
End: 2022-06-02
Payer: MEDICAID

## 2022-06-02 VITALS
SYSTOLIC BLOOD PRESSURE: 135 MMHG | BODY MASS INDEX: 32.61 KG/M2 | DIASTOLIC BLOOD PRESSURE: 91 MMHG | HEIGHT: 64 IN | TEMPERATURE: 97.3 F | WEIGHT: 191 LBS

## 2022-06-02 DIAGNOSIS — R97.20 ELEVATED PSA: Primary | ICD-10-CM

## 2022-06-02 DIAGNOSIS — N40.1 HYPERTROPHY OF PROSTATE WITH URINARY OBSTRUCTION: ICD-10-CM

## 2022-06-02 DIAGNOSIS — N13.8 HYPERTROPHY OF PROSTATE WITH URINARY OBSTRUCTION: ICD-10-CM

## 2022-06-02 LAB
BACTERIA URINE, POC: 0
BILIRUBIN URINE: 0 MG/DL
BLOOD, URINE: NEGATIVE
CASTS URINE, POC: 0
CLARITY: CLEAR
COLOR: YELLOW
CRYSTALS URINE, POC: 0
EPI CELLS URINE, POC: 0
GLUCOSE URINE: NORMAL
KETONES, URINE: NEGATIVE
LEUKOCYTE EST, POC: NORMAL
NITRITE, URINE: NEGATIVE
PH UA: 7 (ref 4.5–8)
PROTEIN UA: NEGATIVE
RBC URINE, POC: 0
SPECIFIC GRAVITY UA: 1.02 (ref 1–1.03)
UROBILINOGEN, URINE: NORMAL
WBC URINE, POC: NORMAL
YEAST URINE, POC: 0

## 2022-06-02 PROCEDURE — 81001 URINALYSIS AUTO W/SCOPE: CPT | Performed by: UROLOGY

## 2022-06-02 PROCEDURE — 99213 OFFICE O/P EST LOW 20 MIN: CPT | Performed by: UROLOGY

## 2022-06-02 ASSESSMENT — ENCOUNTER SYMPTOMS
DIARRHEA: 0
VOMITING: 0
ABDOMINAL PAIN: 0
ABDOMINAL DISTENTION: 0
EYE REDNESS: 0
NAUSEA: 0
TROUBLE SWALLOWING: 0
BACK PAIN: 0
COUGH: 0
SHORTNESS OF BREATH: 0
CONSTIPATION: 0
EYE DISCHARGE: 0

## 2022-06-02 NOTE — PROGRESS NOTES
Madei Lofton is a 61 y.o. male who presents today   Chief Complaint   Patient presents with    Follow-up     I am here today for my 6 mo follow up for elevated PSA     Elevated PSA:  Patient is here today for history of an elevated PSA. His last several PSA values are as follows:  Lab Results   Component Value Date    PSA 1.67 11/21/2019   PSA done on 5/30/2022 was 2.0  PSA done on 11/19/2021 was 3.4  PSA done on 10/5/2021 was 5.1  Overall the PSA level is: decreased  Recent history of urinary tract infection/prostatitis? no  Previous prostate biopsy? no  Lower urinary tract symptoms: nocturia, 1 times per night. He has very minimal LUTS. He really just complains nocturia x1 he does take tamsulosin.       Past Medical History:   Diagnosis Date    Arthrogryposis     congenital    GERD (gastroesophageal reflux disease)     Hx of blood clots     Hypertension     Kidney stone     Kidney stones     Migraines     PONV (postoperative nausea and vomiting)     Pulmonary embolus ([address]     \"years ago\"    Seizure ([address]     hx of x1; weaned off meds per neurologist    Thyroid disease     Graves disease       Past Surgical History:   Procedure Laterality Date    APPENDECTOMY      CERVICAL FUSION  2021    CYSTOSCOPY Left 10/8/2019    CYSTOSCOPY LEFT URETEROSCOPY LASER LITHOTRIPSY, STONE REMOVAL performed by Roman Cogan, MD at [facility] Left 10/8/2019    PLACEMENT OF DOUBLE J STENT performed by Roman Cogan, MD at [address] [facility]   [address] [place] [address]      rtk    LITHOTRIPSY Right 7/30/2020    RIGHT EXTRACORPOREAL SHOCK WAVE LITHOTRIPSY performed by Deepika Heaton MD at [address] [place] [address] OR       Current Outpatient Medications   Medication Sig Dispense Refill    Cyclobenzaprine HCl (FLEXERIL PO) Take 7.5 mg by mouth      zinc gluconate 50 MG tablet Take 50 mg by mouth daily      lisinopril (PRINIVIL;ZESTRIL) 10 MG tablet Take 10 mg by mouth daily       pantoprazole (PROTONIX) 40 MG tablet Take 40 mg by mouth daily       FLUoxetine (PROZAC) 20 MG capsule Take 20 mg by mouth daily       D 5000 5000 units CAPS capsule Take 5,000 Units by mouth daily       topiramate (TOPAMAX) 200 MG tablet Take 200 mg by mouth daily Indications: migraines       tamsulosin (FLOMAX) 0.4 MG capsule Take 0.4 mg by mouth daily        No current facility-administered medications for this visit. Allergies   Allergen Reactions    Latex Rash    Sulfa Antibiotics Other (See Comments)     Extreme skin irritation and peeling of genital area    Codeine Rash       Social History     Socioeconomic History    Marital status:      Spouse name: None    Number of children: None    Years of education: None    Highest education level: None   Occupational History    None   Tobacco Use    Smoking status: Former Smoker    Smokeless tobacco: Current User     Types: Snuff    Tobacco comment: dips 1 can every 2 weeks   Substance and Sexual Activity    Alcohol use: Not Currently    Drug use: Never    Sexual activity: Not Currently   Other Topics Concern    None   Social History Narrative    None     Social Determinants of Health     Financial Resource Strain:     Difficulty of Paying Living Expenses: Not on file   Food Insecurity:     Worried About Running Out of Food in the Last Year: Not on file    Robert of Food in the Last Year: Not on file   Transportation Needs:     Lack of Transportation (Medical): Not on file    Lack of Transportation (Non-Medical):  Not on file   Physical Activity:     Days of Exercise per Week: Not on file    Minutes of Exercise per Session: Not on file   Stress:     Feeling of Stress : Not on file   Social Connections:     Frequency of Communication with Friends and Family: Not on file    Frequency of Social Gatherings with Friends and Family: Not on file    Attends Holiness Services: Not on file    Active Member of Clubs or Organizations: Not on file    Attends Club or Organization Meetings: Not on file    Marital Status: Not on file   Intimate Partner Violence:     Fear of Current or Ex-Partner: Not on file    Emotionally Abused: Not on file    Physically Abused: Not on file    Sexually Abused: Not on file   Housing Stability:     Unable to Pay for Housing in the Last Year: Not on file    Number of [place] in the Last Year: Not on file    Unstable Housing in the Last Year: Not on file       Family History   Problem Relation Age of Onset    Cancer Brother         ureter       REVIEW OF SYSTEMS:  Review of Systems   Constitutional: Negative for chills, fatigue and fever. HENT: Negative for congestion, ear pain and trouble swallowing. Eyes: Negative for discharge and redness. Respiratory: Negative for cough and shortness of breath. Cardiovascular: Negative for chest pain. Gastrointestinal: Negative for abdominal distention, abdominal pain, constipation, diarrhea, nausea and vomiting. Endocrine: Negative for cold intolerance and heat intolerance. Genitourinary: Negative for difficulty urinating, dysuria, flank pain, frequency, hematuria and urgency. Musculoskeletal: Negative for back pain and gait problem. Skin: Negative for pallor and wound. Allergic/Immunologic: Negative for environmental allergies and immunocompromised state. Neurological: Negative for dizziness and weakness. Hematological: Negative for adenopathy. Does not bruise/bleed easily. Psychiatric/Behavioral: Negative for agitation and confusion. PHYSICAL EXAM:  BP (!) 135/91   Temp 97.3 °F (36.3 °C) (Temporal)   Ht 5' 4\" (1.626 m)   Wt 191 lb (86.6 kg)   BMI 32.79 kg/m²   Physical Exam  Constitutional:       General: He is not in acute distress. Appearance: Normal appearance. He is well-developed. HENT:      Head: Normocephalic and atraumatic. Nose: Nose normal.   Eyes:      General: No scleral icterus.      Conjunctiva/sclera: Conjunctivae normal.      Pupils: Pupils are equal, round, and reactive to light. Neck:      Trachea: No tracheal deviation. Cardiovascular:      Rate and Rhythm: Normal rate and regular rhythm. Pulmonary:      Effort: Pulmonary effort is normal. No respiratory distress. Breath sounds: No stridor. Abdominal:      General: There is no distension. Palpations: Abdomen is soft. There is no mass. Tenderness: There is no abdominal tenderness. Musculoskeletal:         General: No tenderness. Normal range of motion. Cervical back: Normal range of motion and neck supple. Lymphadenopathy:      Cervical: No cervical adenopathy. Skin:     General: Skin is warm and dry. Findings: No erythema. Neurological:      Mental Status: He is alert and oriented to person, place, and time. Psychiatric:         Behavior: Behavior normal.         Judgment: Judgment normal.             DATA:    No results found for this visit on 06/02/22. Lab Results   Component Value Date    PSA 1.67 11/21/2019         1. Elevated PSA  PSA remains low below age-adjusted cut of 3.5 therefore we will follow-up in 1 year with PSA prior  - PSA, Diagnostic; Future  - POCT Urinalysis Dipstick w/ Micro (Auto)    2. Hypertrophy of prostate with urinary obstruction  He really has minimal symptoms he takes tamsulosin he will continue this  - POCT Urinalysis Dipstick w/ Micro (Auto)      Orders Placed This Encounter   Procedures    PSA, Diagnostic     In 1 year prior to the next visit     Standing Status:   Future     Standing Expiration Date:   6/2/2024    POCT Urinalysis Dipstick w/ Micro (Auto)        Return in about 1 year (around 6/2/2023) for PSA prior to vext visit. All information inputted into the note by the MA to include chief complaint, past medical history, past surgical history, medications, allergies, social and family history and review of systems has been reviewed and updated as needed by me.     EMR Dragon/transcription disclaimer: Much of this documentt is electronic  transcription/translation of spoken language to printed text. The  electronic translation of spoken language may be erroneous, or at times,  nonsensical words or phrases may be inadvertently transcribed.  Although I  have reviewed the document for such errors, some may still exist.

## 2023-07-13 ENCOUNTER — OFFICE VISIT (OUTPATIENT)
Dept: UROLOGY | Age: 58
End: 2023-07-13
Payer: MEDICAID

## 2023-07-13 VITALS — WEIGHT: 194 LBS | BODY MASS INDEX: 34.38 KG/M2 | HEIGHT: 63 IN | TEMPERATURE: 97.9 F

## 2023-07-13 DIAGNOSIS — N13.8 HYPERTROPHY OF PROSTATE WITH URINARY OBSTRUCTION: Primary | ICD-10-CM

## 2023-07-13 DIAGNOSIS — R97.20 ELEVATED PSA: ICD-10-CM

## 2023-07-13 DIAGNOSIS — N40.1 HYPERTROPHY OF PROSTATE WITH URINARY OBSTRUCTION: Primary | ICD-10-CM

## 2023-07-13 DIAGNOSIS — N39.0 URINARY TRACT INFECTION WITHOUT HEMATURIA, SITE UNSPECIFIED: ICD-10-CM

## 2023-07-13 LAB
BACTERIA URINE, POC: NORMAL
BILIRUBIN URINE: 0 MG/DL
BLOOD, URINE: POSITIVE
CASTS URINE, POC: 0
CLARITY: CLEAR
COLOR: YELLOW
CRYSTALS URINE, POC: 0
EPI CELLS URINE, POC: 0
GLUCOSE URINE: NORMAL
KETONES, URINE: NEGATIVE
LEUKOCYTE EST, POC: NORMAL
NITRITE, URINE: NEGATIVE
PH UA: 6 (ref 4.5–8)
PROTEIN UA: NEGATIVE
RBC URINE, POC: 0
SPECIFIC GRAVITY UA: 1.02 (ref 1–1.03)
UROBILINOGEN, URINE: NORMAL
WBC URINE, POC: 100
YEAST URINE, POC: 0

## 2023-07-13 PROCEDURE — 99214 OFFICE O/P EST MOD 30 MIN: CPT | Performed by: UROLOGY

## 2023-07-13 RX ORDER — CEPHALEXIN 500 MG/1
500 CAPSULE ORAL 3 TIMES DAILY
Qty: 21 CAPSULE | Refills: 0 | Status: SHIPPED | OUTPATIENT
Start: 2023-07-13 | End: 2023-07-20

## 2023-07-13 RX ORDER — TAMSULOSIN HYDROCHLORIDE 0.4 MG/1
0.4 CAPSULE ORAL DAILY
Qty: 90 CAPSULE | Refills: 3 | Status: SHIPPED | OUTPATIENT
Start: 2023-07-13 | End: 2024-07-12

## 2023-07-13 ASSESSMENT — ENCOUNTER SYMPTOMS
EYE DISCHARGE: 0
CONSTIPATION: 0
VOMITING: 0
NAUSEA: 0
BACK PAIN: 0
ABDOMINAL PAIN: 0
TROUBLE SWALLOWING: 0
DIARRHEA: 0
SHORTNESS OF BREATH: 0
ABDOMINAL DISTENTION: 0
EYE REDNESS: 0
COUGH: 0

## 2023-07-13 NOTE — PROGRESS NOTES
AM    CO2 17 07/27/2020 01:00 PM    BUN 21 07/27/2020 01:00 PM    CREATININE 0.8 07/27/2020 01:00 PM    CREATININE 0.7 03/26/2012 11:10 AM    GFRAA >59 07/27/2020 01:00 PM    LABGLOM >60 07/27/2020 01:00 PM    GLUCOSE 121 07/27/2020 01:00 PM    CALCIUM 9.1 07/27/2020 01:00 PM     Results for orders placed or performed in visit on 07/13/23   POCT Urinalysis Dipstick w/ Micro (Auto)   Result Value Ref Range    Color, UA Yellow     Clarity, UA Clear Clear    Glucose, Ur -     Bilirubin Urine 0 mg/dL    Ketones, Urine Negative     Specific Gravity, UA 1.020 1.005 - 1.030    Blood, Urine Positive     pH, UA 6 4.5 - 8.0    Protein, UA Negative Negative    Nitrite, Urine Negative     Leukocytes, UA moderate     Urobilinogen, Urine Normal     RBC Urine, POC 0     WBC Urine,      Bacteria Urine, POC trace     yeast urine, poc 0     Casts Urine, POC 0     Epi Cells Urine, POC 0     crystals urine, poc 0      Lab Results   Component Value Date    PSA 1.67 11/21/2019     No results found for: PSAFREEPCT    Urinalysis had a lot of debris and clumping of white blood cells but not a lot of bacteria it almost looks like contamination. In unspun sample his urine showed just trace bacteria and trace white blood cells. 1. Hypertrophy of prostate with urinary obstruction  Normal prostate on RASHID nonsuspicious. He will continue tamsulosin. - tamsulosin (FLOMAX) 0.4 MG capsule; Take 1 capsule by mouth daily  Dispense: 90 capsule; Refill: 3  - POCT Urinalysis Dipstick w/ Micro (Auto)    2. Urinary tract infection without hematuria, site unspecified  He is urine today looks suspicious for infection however he is not having any local or systemic symptoms will empirically start Keflex and send off a urine culture we will have him return to see the nurse practitioner in 1 week for recheck of his urine. Otherwise he can follow-up in 1 year with PSA prior  - cephALEXin (KEFLEX) 500 MG capsule;  Take 1 capsule by mouth 3 times daily

## 2023-07-15 LAB
BACTERIA UR CULT: ABNORMAL
BACTERIA UR CULT: ABNORMAL
ORGANISM: ABNORMAL

## 2023-07-20 ENCOUNTER — OFFICE VISIT (OUTPATIENT)
Dept: UROLOGY | Age: 58
End: 2023-07-20
Payer: MEDICAID

## 2023-07-20 VITALS — BODY MASS INDEX: 33.88 KG/M2 | WEIGHT: 191.2 LBS | HEIGHT: 63 IN | TEMPERATURE: 97.3 F

## 2023-07-20 DIAGNOSIS — R82.81 PYURIA: Primary | ICD-10-CM

## 2023-07-20 LAB
BACTERIA URINE, POC: 0
BILIRUBIN URINE: 0 MG/DL
BLOOD, URINE: POSITIVE
CASTS URINE, POC: 0
CLARITY: CLEAR
COLOR: YELLOW
CRYSTALS URINE, POC: 0
EPI CELLS URINE, POC: 0
GLUCOSE URINE: NORMAL
KETONES, URINE: NEGATIVE
LEUKOCYTE EST, POC: NORMAL
NITRITE, URINE: NEGATIVE
PH UA: 6 (ref 4.5–8)
PROTEIN UA: NEGATIVE
RBC URINE, POC: 4
SPECIFIC GRAVITY UA: 1.02 (ref 1–1.03)
UROBILINOGEN, URINE: NORMAL
WBC URINE, POC: 20
YEAST URINE, POC: 0

## 2023-07-20 PROCEDURE — 99213 OFFICE O/P EST LOW 20 MIN: CPT | Performed by: NURSE PRACTITIONER

## 2023-07-20 ASSESSMENT — ENCOUNTER SYMPTOMS
VOMITING: 0
NAUSEA: 0
ABDOMINAL DISTENTION: 0
ABDOMINAL PAIN: 0

## 2023-07-20 NOTE — PROGRESS NOTES
1.005 - 1.030    Blood, Urine Positive     pH, UA 6 4.5 - 8.0    Protein, UA Negative Negative    Nitrite, Urine Negative     Leukocytes, UA small     Urobilinogen, Urine Normal     RBC Urine, POC 4     WBC Urine, POC 20     Bacteria Urine, POC 0     yeast urine, poc 0     Casts Urine, POC 0     Epi Cells Urine, POC 0     crystals urine, poc 0        ASSESSMENT/PLAN  1. Pyuria  Continued asymptomatic pyuria. - POCT Urinalysis Dipstick w/ Micro (Auto)    At least 20 minutes were spent on the day of the visit reviewing the patient's past medical records/imaging, speaking face to face with the patient, and charting in the post visit period. Orders Placed This Encounter   Procedures    POCT Urinalysis Dipstick w/ Micro (Auto)        No follow-ups on file. An electronic signature was used to authenticate this note. OSCAR BALDERAS - CNP    All information inputted into the note by the MA to include chief complaint, past medical history, past surgical history, medications, allergies, social and family history and review of systems has been reviewed and updated as needed by me. EMR Dragon/transcription disclaimer: Much of this document is electronic transcription/translation of spoken language to printed text. The electronic translation of spoken language may be erroneous or, at times, nonsensical words or phrases may be inadvertently transcribed.  Although I have reviewed the document for such errors, some may still exist.

## 2023-11-21 ENCOUNTER — ANESTHESIA EVENT (OUTPATIENT)
Dept: OPERATING ROOM | Age: 58
End: 2023-11-21
Payer: MEDICAID

## 2023-11-21 ENCOUNTER — ANESTHESIA (OUTPATIENT)
Dept: OPERATING ROOM | Age: 58
End: 2023-11-21
Payer: MEDICAID

## 2023-11-21 ENCOUNTER — TELEPHONE (OUTPATIENT)
Dept: UROLOGY | Age: 58
End: 2023-11-21

## 2023-11-21 ENCOUNTER — OFFICE VISIT (OUTPATIENT)
Dept: UROLOGY | Age: 58
End: 2023-11-21
Payer: MEDICAID

## 2023-11-21 ENCOUNTER — APPOINTMENT (OUTPATIENT)
Dept: GENERAL RADIOLOGY | Age: 58
End: 2023-11-21
Attending: UROLOGY
Payer: MEDICAID

## 2023-11-21 ENCOUNTER — HOSPITAL ENCOUNTER (OUTPATIENT)
Age: 58
Setting detail: OUTPATIENT SURGERY
Discharge: HOME | End: 2023-11-21
Attending: UROLOGY | Admitting: UROLOGY
Payer: MEDICAID

## 2023-11-21 VITALS
TEMPERATURE: 97.4 F | RESPIRATION RATE: 20 BRPM | BODY MASS INDEX: 32.95 KG/M2 | WEIGHT: 193 LBS | SYSTOLIC BLOOD PRESSURE: 118 MMHG | DIASTOLIC BLOOD PRESSURE: 77 MMHG | HEART RATE: 77 BPM | HEIGHT: 64 IN | OXYGEN SATURATION: 97 %

## 2023-11-21 VITALS — TEMPERATURE: 98 F | HEIGHT: 64 IN | WEIGHT: 193.2 LBS | BODY MASS INDEX: 32.98 KG/M2

## 2023-11-21 DIAGNOSIS — N20.0 LEFT RENAL STONE: ICD-10-CM

## 2023-11-21 DIAGNOSIS — N20.0 RIGHT RENAL STONE: ICD-10-CM

## 2023-11-21 DIAGNOSIS — N20.1 CALCULUS OF PROXIMAL LEFT URETER: ICD-10-CM

## 2023-11-21 DIAGNOSIS — N20.1 CALCULUS OF PROXIMAL LEFT URETER: Primary | ICD-10-CM

## 2023-11-21 LAB
ANION GAP SERPL CALCULATED.3IONS-SCNC: 12 MMOL/L (ref 7–19)
APPEARANCE FLUID: CLEAR
APTT PPP: 31.5 SEC (ref 26–36.2)
BASOPHILS # BLD: 0.1 K/UL (ref 0–0.2)
BASOPHILS NFR BLD: 0.4 % (ref 0–1)
BILIRUBIN, POC: NORMAL
BLOOD URINE, POC: NORMAL
BUN SERPL-MCNC: 22 MG/DL (ref 6–20)
CALCIUM SERPL-MCNC: 8.5 MG/DL (ref 8.6–10)
CHLORIDE SERPL-SCNC: 105 MMOL/L (ref 98–111)
CLARITY, POC: CLEAR
CLOSURE TME BLD-IMP: NORMAL
CLOSURE TME COLL+EPINEP BLD: 89 SEC (ref 84–176)
CO2 SERPL-SCNC: 16 MMOL/L (ref 22–29)
COLOR, POC: YELLOW
CREAT SERPL-MCNC: 1.3 MG/DL (ref 0.5–1.2)
EOSINOPHIL # BLD: 0.1 K/UL (ref 0–0.6)
EOSINOPHIL NFR BLD: 0.7 % (ref 0–5)
ERYTHROCYTE [DISTWIDTH] IN BLOOD BY AUTOMATED COUNT: 12.6 % (ref 11.5–14.5)
GLUCOSE SERPL-MCNC: 96 MG/DL (ref 74–109)
GLUCOSE URINE, POC: NORMAL
HCT VFR BLD AUTO: 39.3 % (ref 42–52)
HGB BLD-MCNC: 13.4 G/DL (ref 14–18)
IMM GRANULOCYTES # BLD: 0 K/UL
INR PPP: 1.32 (ref 0.88–1.18)
KETONES, POC: 15
LEUKOCYTE EST, POC: NORMAL
LYMPHOCYTES # BLD: 1.7 K/UL (ref 1.1–4.5)
LYMPHOCYTES NFR BLD: 11.9 % (ref 20–40)
MCH RBC QN AUTO: 32 PG (ref 27–31)
MCHC RBC AUTO-ENTMCNC: 34.1 G/DL (ref 33–37)
MCV RBC AUTO: 93.8 FL (ref 80–94)
MONOCYTES # BLD: 1.6 K/UL (ref 0–0.9)
MONOCYTES NFR BLD: 11.6 % (ref 0–10)
NEUTROPHILS # BLD: 10.6 K/UL (ref 1.5–7.5)
NEUTS SEG NFR BLD: 75.2 % (ref 50–65)
NITRITE, POC: NORMAL
PH, POC: 6.5
PLATELET # BLD AUTO: 190 K/UL (ref 130–400)
PMV BLD AUTO: 9.5 FL (ref 9.4–12.4)
POTASSIUM SERPL-SCNC: 3.6 MMOL/L (ref 3.5–4.9)
PROTEIN, POC: 30
PROTHROMBIN TIME: 16 SEC (ref 12–14.6)
RBC # BLD AUTO: 4.19 M/UL (ref 4.7–6.1)
SODIUM SERPL-SCNC: 133 MMOL/L (ref 136–145)
SPECIFIC GRAVITY, POC: 1.02
UROBILINOGEN, POC: 8
WBC # BLD AUTO: 14.1 K/UL (ref 4.8–10.8)

## 2023-11-21 PROCEDURE — 81002 URINALYSIS NONAUTO W/O SCOPE: CPT | Performed by: NURSE PRACTITIONER

## 2023-11-21 PROCEDURE — 7100000001 HC PACU RECOVERY - ADDTL 15 MIN: Performed by: UROLOGY

## 2023-11-21 PROCEDURE — 2500000003 HC RX 250 WO HCPCS: Performed by: NURSE ANESTHETIST, CERTIFIED REGISTERED

## 2023-11-21 PROCEDURE — 2500000003 HC RX 250 WO HCPCS: Performed by: ANESTHESIOLOGY

## 2023-11-21 PROCEDURE — 85576 BLOOD PLATELET AGGREGATION: CPT

## 2023-11-21 PROCEDURE — 85025 COMPLETE CBC W/AUTO DIFF WBC: CPT

## 2023-11-21 PROCEDURE — 3600000004 HC SURGERY LEVEL 4 BASE: Performed by: UROLOGY

## 2023-11-21 PROCEDURE — 74018 RADEX ABDOMEN 1 VIEW: CPT

## 2023-11-21 PROCEDURE — 6360000002 HC RX W HCPCS: Performed by: ANESTHESIOLOGY

## 2023-11-21 PROCEDURE — 99214 OFFICE O/P EST MOD 30 MIN: CPT | Performed by: NURSE PRACTITIONER

## 2023-11-21 PROCEDURE — 3700000000 HC ANESTHESIA ATTENDED CARE: Performed by: UROLOGY

## 2023-11-21 PROCEDURE — 6370000000 HC RX 637 (ALT 250 FOR IP): Performed by: UROLOGY

## 2023-11-21 PROCEDURE — 85610 PROTHROMBIN TIME: CPT

## 2023-11-21 PROCEDURE — 3700000001 HC ADD 15 MINUTES (ANESTHESIA): Performed by: UROLOGY

## 2023-11-21 PROCEDURE — C1758 CATHETER, URETERAL: HCPCS | Performed by: UROLOGY

## 2023-11-21 PROCEDURE — 6370000000 HC RX 637 (ALT 250 FOR IP): Performed by: ANESTHESIOLOGY

## 2023-11-21 PROCEDURE — 36415 COLL VENOUS BLD VENIPUNCTURE: CPT

## 2023-11-21 PROCEDURE — 6360000002 HC RX W HCPCS: Performed by: NURSE ANESTHETIST, CERTIFIED REGISTERED

## 2023-11-21 PROCEDURE — A4216 STERILE WATER/SALINE, 10 ML: HCPCS | Performed by: ANESTHESIOLOGY

## 2023-11-21 PROCEDURE — 3600000014 HC SURGERY LEVEL 4 ADDTL 15MIN: Performed by: UROLOGY

## 2023-11-21 PROCEDURE — 80048 BASIC METABOLIC PNL TOTAL CA: CPT

## 2023-11-21 PROCEDURE — 7100000010 HC PHASE II RECOVERY - FIRST 15 MIN: Performed by: UROLOGY

## 2023-11-21 PROCEDURE — C1769 GUIDE WIRE: HCPCS | Performed by: UROLOGY

## 2023-11-21 PROCEDURE — C2617 STENT, NON-COR, TEM W/O DEL: HCPCS | Performed by: UROLOGY

## 2023-11-21 PROCEDURE — 7100000011 HC PHASE II RECOVERY - ADDTL 15 MIN: Performed by: UROLOGY

## 2023-11-21 PROCEDURE — 2580000003 HC RX 258: Performed by: ANESTHESIOLOGY

## 2023-11-21 PROCEDURE — 7100000000 HC PACU RECOVERY - FIRST 15 MIN: Performed by: UROLOGY

## 2023-11-21 PROCEDURE — 74420 UROGRAPHY RTRGR +-KUB: CPT

## 2023-11-21 PROCEDURE — 87086 URINE CULTURE/COLONY COUNT: CPT

## 2023-11-21 PROCEDURE — 85730 THROMBOPLASTIN TIME PARTIAL: CPT

## 2023-11-21 PROCEDURE — 87186 SC STD MICRODIL/AGAR DIL: CPT

## 2023-11-21 PROCEDURE — 2709999900 HC NON-CHARGEABLE SUPPLY: Performed by: UROLOGY

## 2023-11-21 DEVICE — URETERAL STENT WITH SIDE HOLES 6FX22CM
Type: IMPLANTABLE DEVICE | Site: URETER | Status: FUNCTIONAL
Brand: TRIA™ SOFT

## 2023-11-21 RX ORDER — ONDANSETRON 2 MG/ML
INJECTION INTRAMUSCULAR; INTRAVENOUS PRN
Status: DISCONTINUED | OUTPATIENT
Start: 2023-11-21 | End: 2023-11-21 | Stop reason: SDUPTHER

## 2023-11-21 RX ORDER — SODIUM CHLORIDE 9 MG/ML
INJECTION, SOLUTION INTRAVENOUS PRN
Status: DISCONTINUED | OUTPATIENT
Start: 2023-11-21 | End: 2023-11-21 | Stop reason: HOSPADM

## 2023-11-21 RX ORDER — SODIUM CHLORIDE 0.9 % (FLUSH) 0.9 %
5-40 SYRINGE (ML) INJECTION EVERY 12 HOURS SCHEDULED
Status: DISCONTINUED | OUTPATIENT
Start: 2023-11-21 | End: 2023-11-21 | Stop reason: HOSPADM

## 2023-11-21 RX ORDER — LIDOCAINE HYDROCHLORIDE 10 MG/ML
1 INJECTION, SOLUTION EPIDURAL; INFILTRATION; INTRACAUDAL; PERINEURAL
Status: DISCONTINUED | OUTPATIENT
Start: 2023-11-21 | End: 2023-11-21 | Stop reason: HOSPADM

## 2023-11-21 RX ORDER — HYDROMORPHONE HYDROCHLORIDE 1 MG/ML
0.5 INJECTION, SOLUTION INTRAMUSCULAR; INTRAVENOUS; SUBCUTANEOUS EVERY 5 MIN PRN
Status: DISCONTINUED | OUTPATIENT
Start: 2023-11-21 | End: 2023-11-21 | Stop reason: HOSPADM

## 2023-11-21 RX ORDER — HYDROCODONE BITARTRATE AND ACETAMINOPHEN 5; 325 MG/1; MG/1
1 TABLET ORAL EVERY 6 HOURS PRN
Qty: 20 TABLET | Refills: 0 | Status: SHIPPED | OUTPATIENT
Start: 2023-11-21 | End: 2023-11-26

## 2023-11-21 RX ORDER — FENTANYL CITRATE 50 UG/ML
INJECTION, SOLUTION INTRAMUSCULAR; INTRAVENOUS PRN
Status: DISCONTINUED | OUTPATIENT
Start: 2023-11-21 | End: 2023-11-21 | Stop reason: SDUPTHER

## 2023-11-21 RX ORDER — PROPOFOL 10 MG/ML
INJECTION, EMULSION INTRAVENOUS PRN
Status: DISCONTINUED | OUTPATIENT
Start: 2023-11-21 | End: 2023-11-21 | Stop reason: SDUPTHER

## 2023-11-21 RX ORDER — MIDAZOLAM HYDROCHLORIDE 2 MG/2ML
2 INJECTION, SOLUTION INTRAMUSCULAR; INTRAVENOUS
Status: DISCONTINUED | OUTPATIENT
Start: 2023-11-21 | End: 2023-11-21 | Stop reason: HOSPADM

## 2023-11-21 RX ORDER — ALFUZOSIN HYDROCHLORIDE 10 MG/1
10 TABLET, EXTENDED RELEASE ORAL ONCE
Status: COMPLETED | OUTPATIENT
Start: 2023-11-21 | End: 2023-11-21

## 2023-11-21 RX ORDER — SODIUM CHLORIDE 0.9 % (FLUSH) 0.9 %
5-40 SYRINGE (ML) INJECTION PRN
Status: DISCONTINUED | OUTPATIENT
Start: 2023-11-21 | End: 2023-11-21 | Stop reason: HOSPADM

## 2023-11-21 RX ORDER — MEPERIDINE HYDROCHLORIDE 25 MG/ML
12.5 INJECTION INTRAMUSCULAR; INTRAVENOUS; SUBCUTANEOUS
Status: DISCONTINUED | OUTPATIENT
Start: 2023-11-21 | End: 2023-11-21 | Stop reason: HOSPADM

## 2023-11-21 RX ORDER — PHENAZOPYRIDINE HYDROCHLORIDE 100 MG/1
100 TABLET, FILM COATED ORAL 3 TIMES DAILY PRN
Qty: 30 TABLET | Refills: 1 | Status: SHIPPED | OUTPATIENT
Start: 2023-11-21

## 2023-11-21 RX ORDER — LEVOFLOXACIN 5 MG/ML
500 INJECTION, SOLUTION INTRAVENOUS ONCE
Status: COMPLETED | OUTPATIENT
Start: 2023-11-21 | End: 2023-11-21

## 2023-11-21 RX ORDER — SODIUM CHLORIDE, SODIUM LACTATE, POTASSIUM CHLORIDE, CALCIUM CHLORIDE 600; 310; 30; 20 MG/100ML; MG/100ML; MG/100ML; MG/100ML
INJECTION, SOLUTION INTRAVENOUS CONTINUOUS
Status: DISCONTINUED | OUTPATIENT
Start: 2023-11-21 | End: 2023-11-21 | Stop reason: HOSPADM

## 2023-11-21 RX ORDER — CEFDINIR 300 MG/1
300 CAPSULE ORAL 2 TIMES DAILY
Qty: 20 CAPSULE | Refills: 0 | Status: SHIPPED | OUTPATIENT
Start: 2023-11-21 | End: 2023-12-01

## 2023-11-21 RX ORDER — DEXAMETHASONE SODIUM PHOSPHATE 10 MG/ML
INJECTION, SOLUTION INTRAMUSCULAR; INTRAVENOUS PRN
Status: DISCONTINUED | OUTPATIENT
Start: 2023-11-21 | End: 2023-11-21 | Stop reason: SDUPTHER

## 2023-11-21 RX ORDER — OXYCODONE HYDROCHLORIDE AND ACETAMINOPHEN 5; 325 MG/1; MG/1
2 TABLET ORAL EVERY 4 HOURS PRN
Status: DISCONTINUED | OUTPATIENT
Start: 2023-11-21 | End: 2023-11-21 | Stop reason: HOSPADM

## 2023-11-21 RX ORDER — DEXAMETHASONE SODIUM PHOSPHATE 4 MG/ML
4 INJECTION, SOLUTION INTRA-ARTICULAR; INTRALESIONAL; INTRAMUSCULAR; INTRAVENOUS; SOFT TISSUE ONCE
Status: COMPLETED | OUTPATIENT
Start: 2023-11-21 | End: 2023-11-21

## 2023-11-21 RX ORDER — ONDANSETRON 2 MG/ML
4 INJECTION INTRAMUSCULAR; INTRAVENOUS EVERY 4 HOURS PRN
Status: DISCONTINUED | OUTPATIENT
Start: 2023-11-21 | End: 2023-11-21 | Stop reason: HOSPADM

## 2023-11-21 RX ORDER — PROCHLORPERAZINE EDISYLATE 5 MG/ML
5 INJECTION INTRAMUSCULAR; INTRAVENOUS
Status: DISCONTINUED | OUTPATIENT
Start: 2023-11-21 | End: 2023-11-21 | Stop reason: HOSPADM

## 2023-11-21 RX ORDER — IPRATROPIUM BROMIDE AND ALBUTEROL SULFATE 2.5; .5 MG/3ML; MG/3ML
1 SOLUTION RESPIRATORY (INHALATION)
Status: DISCONTINUED | OUTPATIENT
Start: 2023-11-21 | End: 2023-11-21 | Stop reason: HOSPADM

## 2023-11-21 RX ORDER — 0.9 % SODIUM CHLORIDE 0.9 %
500 INTRAVENOUS SOLUTION INTRAVENOUS ONCE
Status: DISCONTINUED | OUTPATIENT
Start: 2023-11-21 | End: 2023-11-21 | Stop reason: HOSPADM

## 2023-11-21 RX ORDER — SCOLOPAMINE TRANSDERMAL SYSTEM 1 MG/1
1 PATCH, EXTENDED RELEASE TRANSDERMAL
Status: DISCONTINUED | OUTPATIENT
Start: 2023-11-21 | End: 2023-11-21 | Stop reason: HOSPADM

## 2023-11-21 RX ORDER — SODIUM CHLORIDE 9 MG/ML
INJECTION, SOLUTION INTRAVENOUS CONTINUOUS
Status: DISCONTINUED | OUTPATIENT
Start: 2023-11-21 | End: 2023-11-21 | Stop reason: HOSPADM

## 2023-11-21 RX ORDER — LIDOCAINE HYDROCHLORIDE 10 MG/ML
INJECTION, SOLUTION EPIDURAL; INFILTRATION; INTRACAUDAL; PERINEURAL PRN
Status: DISCONTINUED | OUTPATIENT
Start: 2023-11-21 | End: 2023-11-21 | Stop reason: SDUPTHER

## 2023-11-21 RX ORDER — LABETALOL HYDROCHLORIDE 5 MG/ML
10 INJECTION, SOLUTION INTRAVENOUS
Status: DISCONTINUED | OUTPATIENT
Start: 2023-11-21 | End: 2023-11-21 | Stop reason: HOSPADM

## 2023-11-21 RX ORDER — HYDROMORPHONE HYDROCHLORIDE 1 MG/ML
0.25 INJECTION, SOLUTION INTRAMUSCULAR; INTRAVENOUS; SUBCUTANEOUS EVERY 5 MIN PRN
Status: DISCONTINUED | OUTPATIENT
Start: 2023-11-21 | End: 2023-11-21 | Stop reason: HOSPADM

## 2023-11-21 RX ORDER — DIPHENHYDRAMINE HYDROCHLORIDE 50 MG/ML
12.5 INJECTION INTRAMUSCULAR; INTRAVENOUS
Status: DISCONTINUED | OUTPATIENT
Start: 2023-11-21 | End: 2023-11-21 | Stop reason: HOSPADM

## 2023-11-21 RX ORDER — HYDRALAZINE HYDROCHLORIDE 20 MG/ML
10 INJECTION INTRAMUSCULAR; INTRAVENOUS
Status: DISCONTINUED | OUTPATIENT
Start: 2023-11-21 | End: 2023-11-21 | Stop reason: HOSPADM

## 2023-11-21 RX ORDER — ROCURONIUM BROMIDE 10 MG/ML
INJECTION, SOLUTION INTRAVENOUS PRN
Status: DISCONTINUED | OUTPATIENT
Start: 2023-11-21 | End: 2023-11-21 | Stop reason: SDUPTHER

## 2023-11-21 RX ORDER — HYDROMORPHONE HYDROCHLORIDE 1 MG/ML
0.5 INJECTION, SOLUTION INTRAMUSCULAR; INTRAVENOUS; SUBCUTANEOUS
Status: DISCONTINUED | OUTPATIENT
Start: 2023-11-21 | End: 2023-11-21 | Stop reason: HOSPADM

## 2023-11-21 RX ADMIN — PHENYLEPHRINE HYDROCHLORIDE 200 MCG: 10 INJECTION INTRAVENOUS at 15:12

## 2023-11-21 RX ADMIN — FAMOTIDINE 20 MG: 10 INJECTION, SOLUTION INTRAVENOUS at 14:44

## 2023-11-21 RX ADMIN — SODIUM CHLORIDE, POTASSIUM CHLORIDE, SODIUM LACTATE AND CALCIUM CHLORIDE: 600; 310; 30; 20 INJECTION, SOLUTION INTRAVENOUS at 14:00

## 2023-11-21 RX ADMIN — PHENYLEPHRINE HYDROCHLORIDE 100 MCG: 10 INJECTION INTRAVENOUS at 15:08

## 2023-11-21 RX ADMIN — ALFUZOSIN HYDROCHLORIDE 10 MG: 10 TABLET, FILM COATED, EXTENDED RELEASE ORAL at 16:36

## 2023-11-21 RX ADMIN — LIDOCAINE HYDROCHLORIDE 50 MG: 10 INJECTION, SOLUTION EPIDURAL; INFILTRATION; INTRACAUDAL; PERINEURAL at 14:54

## 2023-11-21 RX ADMIN — DEXAMETHASONE SODIUM PHOSPHATE 4 MG: 4 INJECTION INTRA-ARTICULAR; INTRALESIONAL; INTRAMUSCULAR; INTRAVENOUS; SOFT TISSUE at 14:44

## 2023-11-21 RX ADMIN — DEXAMETHASONE SODIUM PHOSPHATE 10 MG: 10 INJECTION, SOLUTION INTRAMUSCULAR; INTRAVENOUS at 15:22

## 2023-11-21 RX ADMIN — FENTANYL CITRATE 100 MCG: 0.05 INJECTION, SOLUTION INTRAMUSCULAR; INTRAVENOUS at 14:54

## 2023-11-21 RX ADMIN — ROCURONIUM BROMIDE 40 MG: 10 INJECTION, SOLUTION INTRAVENOUS at 14:54

## 2023-11-21 RX ADMIN — SUGAMMADEX 250 MG: 100 INJECTION, SOLUTION INTRAVENOUS at 15:22

## 2023-11-21 RX ADMIN — LEVOFLOXACIN 500 MG: 5 INJECTION, SOLUTION INTRAVENOUS at 15:04

## 2023-11-21 RX ADMIN — PROPOFOL 200 MG: 10 INJECTION, EMULSION INTRAVENOUS at 14:54

## 2023-11-21 RX ADMIN — ONDANSETRON 4 MG: 2 INJECTION INTRAMUSCULAR; INTRAVENOUS at 15:22

## 2023-11-21 RX ADMIN — HYOSCYAMINE SULFATE 125 MCG: 0.12 TABLET ORAL; SUBLINGUAL at 16:37

## 2023-11-21 ASSESSMENT — ENCOUNTER SYMPTOMS
VOMITING: 0
ABDOMINAL DISTENTION: 0
ABDOMINAL PAIN: 0
BACK PAIN: 1
NAUSEA: 1

## 2023-11-21 ASSESSMENT — PAIN DESCRIPTION - DESCRIPTORS: DESCRIPTORS: DISCOMFORT

## 2023-11-21 ASSESSMENT — PAIN DESCRIPTION - ORIENTATION: ORIENTATION: LEFT

## 2023-11-21 ASSESSMENT — PAIN SCALES - GENERAL: PAINLEVEL_OUTOF10: 2

## 2023-11-21 ASSESSMENT — PAIN - FUNCTIONAL ASSESSMENT
PAIN_FUNCTIONAL_ASSESSMENT: NONE - DENIES PAIN
PAIN_FUNCTIONAL_ASSESSMENT: ACTIVITIES ARE NOT PREVENTED

## 2023-11-21 ASSESSMENT — LIFESTYLE VARIABLES: SMOKING_STATUS: 1

## 2023-11-21 ASSESSMENT — PAIN DESCRIPTION - LOCATION: LOCATION: FLANK

## 2023-11-21 NOTE — PROGRESS NOTES
Joshua Sam is a 60 y.o., male, Established patient who presents today   Chief Complaint   Patient presents with    Follow-up     I am here today because I have a 12.4 mm stone. My CT is on a disc. KUB can be done by PAT. I haven't had any food since 2pm yesterday. I had a coffee at like 10:00 no creamer. I havent had any nsaid recently. Patient was seen in [facility] emergency room yesterday with complaints of worsening left flank and groin pain after a 2-day course of nausea vomiting. Patient described the pain as similar to previous stone episodes he has had. CT imaging in the emergency room revealed a 12.4 mm proximal ureteral stone with resulting hydronephrosis. Patient also has several nonobstructing stones bilaterally. Creatinine mildly elevated to 1.23, mild leukocytosis at 10.2. Urine culture pending with 1+ bacteria, too numerous to count white blood cells, 25-50 RBCs noted on urinalysis. Of note, patient does have chronic asymptomatic pyuria. He was given a dose of Toradol in the emergency room. He also reports he has utilized aleve within the last week. He is on Flomax regularly. He is also maintained on Topamax. Today, he complains of nausea without vomiting, moderate to severe left flank pain. Patient does have a known history of nephrolithiasis. His last surgical intervention was 7/30/2020 when he underwent ESWL for an 8 mm right renal stone. He has had multiple procedures in the past for stones. Previous stone composition unknown.     Past Medical History:   Diagnosis Date    Arthrogryposis     congenital    GERD (gastroesophageal reflux disease)     Hx of blood clots     Hypertension     Kidney stone     Kidney stones     Migraines     PONV (postoperative nausea and vomiting)     Pulmonary embolus ([address])     \"years ago\"    Seizure ([address])     hx of x1; weaned off meds per neurologist    Thyroid disease     Graves disease       Past Surgical History:   Procedure Laterality

## 2023-11-21 NOTE — TELEPHONE ENCOUNTER
Pt called asking for update on being seen. I contacted  and was told office was waiting on imaging from [facility], per pt he has a 12.4 kidney stone. Informed pt of wait, he understood. However, pt is asking for medication to help with the pain.  Please send to  [address] [place] [address]

## 2023-11-21 NOTE — ANESTHESIA POSTPROCEDURE EVALUATION
Department of Anesthesiology  Postprocedure Note    Patient: Breezy Vaughan  MRN: [id number]  YOB: 1965  Date of evaluation: 11/21/2023      Procedure Summary     Date: 11/21/23 Room / Location: [facility]    Anesthesia Start: 1449 Anesthesia Stop:     Procedures:       CYSTOSCOPY RETROGRADE [address] (Left)      URETERAL STENT PLACEMENT-LEFT (Left) Diagnosis:       Calculus of proximal left ureter      (Calculus of proximal left ureter [N20.1])    Surgeons: Min Galdamez MD Responsible Provider: OSCAR Fajardo CRNA    Anesthesia Type: general ASA Status: 2          Anesthesia Type: No value filed.     Fifi Phase I: Fifi Score: 10    Fifi Phase II:        Anesthesia Post Evaluation    Patient location during evaluation: PACU  Patient participation: complete - patient participated  Level of consciousness: awake  Pain score: 0  Nausea & Vomiting: no nausea and no vomiting  Complications: no  Cardiovascular status: hemodynamically stable  Respiratory status: acceptable and spontaneous ventilation  Hydration status: euvolemic  Pain management: adequate

## 2023-11-21 NOTE — INTERVAL H&P NOTE
Update History & Physical    The patient's History and Physical of November 21, 2023 was reviewed with the patient and I examined the patient. There was no change. The surgical site was confirmed by the patient and me. Plan: The risks, benefits, expected outcome, and alternative to the recommended procedure have been discussed with the patient. Patient understands and wants to proceed with the procedure.      Electronically signed by Sunil Carmona MD on 11/21/2023 at 2:10 PM

## 2023-11-21 NOTE — OP NOTE
Brief Operative Note      Patient: Khushbu Baker  YOB: 1965  MRN: [id number]    Date of Procedure: 11/21/2023    Pre-Op Diagnosis Codes:     * Calculus of proximal left ureter [N20.1]    Post-Op Diagnosis: Same       Procedure(s):  CYSTOSCOPY RETROGRADE PYLEOGRAMS STONE MANIPULATION  URETERAL STENT PLACEMENT-LEFT    Surgeon(s):  Jaylene Mcgee MD    Assistant:   * No surgical staff found *    Anesthesia: General    Estimated Blood Loss (mL): 0    Complications: None    Specimens:   ID Type Source Tests Collected by Time Destination   A : Urine from left kidney Urine Kidney CULTURE, URINE Jayleen Mcgee MD 11/21/2023 1513        Implants:  Implant Name Type Inv. Item Serial No.  Lot No. LRB No. Used Action   STENT URET 6 FRX22 CM SFT MONOFILAMENT TRIA - [id number]  STENT URET 6 FRX22 CM SFT MONOFILAMENT TRIA  [employer] [id number] Left 1 Implanted         Drains: * No LDAs found *    Findings: Stone seen on KUB and clearly seen on fluoroscopy. Large stone in the proximal left ureter/UPJ. This was manipulated up into the mid left kidney renal pelvis. Grossly cloudy and purulent urine aspirated from the left kidney sent for culture. 6 [place] by 22 cm Tria soft ureteral stent placed.         Detailed Description of Procedure:   See dictated report: [id number]    Disposition to PACU then to op care outpatient follow-up 1 week after course of antibiotics to be scheduled for ESWL cystoscopy stent removal    Electronically signed by Jennifer Cuello MD on 11/21/2023 at 3:43 PM

## 2023-11-21 NOTE — H&P (VIEW-ONLY)
Alpesh Gage is a 60 y.o., male, Established patient who presents today   Chief Complaint   Patient presents with    Follow-up     I am here today because I have a 12.4 mm stone. My CT is on a disc. KUB can be done by PAT. I haven't had any food since 2pm yesterday. I had a coffee at like 10:00 no creamer. I havent had any nsaid recently. Patient was seen in [facility] emergency room yesterday with complaints of worsening left flank and groin pain after a 2-day course of nausea vomiting. Patient described the pain as similar to previous stone episodes he has had. CT imaging in the emergency room revealed a 12.4 mm proximal ureteral stone with resulting hydronephrosis. Patient also has several nonobstructing stones bilaterally. Creatinine mildly elevated to 1.23, mild leukocytosis at 10.2. Urine culture pending with 1+ bacteria, too numerous to count white blood cells, 25-50 RBCs noted on urinalysis. Of note, patient does have chronic asymptomatic pyuria. He was given a dose of Toradol in the emergency room. He also reports he has utilized aleve within the last week. He is on Flomax regularly. He is also maintained on Topamax. Today, he complains of nausea without vomiting, moderate to severe left flank pain. Patient does have a known history of nephrolithiasis. His last surgical intervention was 7/30/2020 when he underwent ESWL for an 8 mm right renal stone. He has had multiple procedures in the past for stones. Previous stone composition unknown.     Past Medical History:   Diagnosis Date    Arthrogryposis     congenital    GERD (gastroesophageal reflux disease)     Hx of blood clots     Hypertension     Kidney stone     Kidney stones     Migraines     PONV (postoperative nausea and vomiting)     Pulmonary embolus ([address])     \"years ago\"    Seizure ([address])     hx of x1; weaned off meds per neurologist    Thyroid disease     Graves disease       Past Surgical History:   Procedure Laterality 24-Nov-2021

## 2023-11-21 NOTE — DISCHARGE INSTRUCTIONS
[address] [facility], Dr Angy Shirley    Cystoscopy / Ureteroscopy / Bladder Endoscopy Procedures Discharge Instructions      You may experience : Burning sensation when you void     A feeling of a need to go to the bathroom frequently     You may have urgent urination     Your urine may be blood tinged    These symptoms should be relieved within a few hours and days  as you increase the amounts of fluids you drink and the number of times that you empty your bladder. They may persist for 1-2 weeks if you have a stent or had a biopsy or resection. We recommended that you drink plenty of fluid a few days after surgery. If you have a ureteral stent he may have pain in your side or back related to the stent. Stents also cause bladder irritation symptoms of frequency and urgency. No strenuous activities for 1 week or  until you talk to your doctor. You may feel light headed up to 24 hours after anesthesia. You should not do the following for the next 24 hours. Drive a car, operate machinery or power tools     Drink any alcoholic drinks (not even beer or wine)     Make any important decisions, i.e., signing important papers. It is recommended that you begin with clear liquids and/or light foods. If you  Are not nauseated, progress to your normal diet. I you are unable to urinate you should call your surgeon.     Dr. Yunier Vickers

## 2023-11-21 NOTE — H&P (VIEW-ONLY)
follow-ups on file. An electronic signature was used to authenticate this note. JESSICA VAZQUEZ, OSCAR - CNP    All information inputted into the note by the MA to include chief complaint, past medical history, past surgical history, medications, allergies, social and family history and review of systems has been reviewed and updated as needed by me. EMR Dragon/transcription disclaimer: Much of this document is electronic transcription/translation of spoken language to printed text. The electronic translation of spoken language may be erroneous or, at times, nonsensical words or phrases may be inadvertently transcribed.  Although I have reviewed the document for such errors, some may still exist.

## 2023-11-21 NOTE — PROGRESS NOTES
Pt meds have been sent to pharmacy in [place], [place]. As per his direction. Family member to  meds before they close.

## 2023-11-22 ENCOUNTER — TELEPHONE (OUTPATIENT)
Dept: UROLOGY | Age: 58
End: 2023-11-22

## 2023-11-22 NOTE — OP NOTE
[facility] [address], [address] [place] [address]                                OPERATIVE REPORT    PATIENT NAME: Alpesh Gage                      :        1965  MED REC NO:   [id number]                              ROOM:  ACCOUNT NO:   [de-identified]                           ADMIT DATE: 2023  PROVIDER:     Yunier Vickers MD    DATE OF PROCEDURE:  2023    TITLE OF OPERATION:  1. Cystoscopy, left retrograde stone manipulation. 2.  Placement of a 6 [place] x 22 cm Tria soft left ureteral stent. PREOPERATIVE DIAGNOSIS:  Left proximal ureteral calculus. POSTOPERATIVE DIAGNOSIS:  Left proximal ureteral calculus. ANESTHETIC:  General anesthetic. ATTENDING SURGEON:  Yunier Vickers MD    ESTIMATED BLOOD LOSS:  0 mL. HISTORY:  The patient is a 42-year-old gentleman, has a prior history of  stones. He was seen in our office today after being in the [facility] Emergency Room yesterday with a two-day history of severe left  flank pain. The CT showed a 12.4 mm stone in the left proximal ureter  with left hydronephrosis. He also had some mild leukocytosis with a  white count of 10.2. Today, the white count is 14. Urine culture was  pending with 1+ bacteria. Today, his urinalysis in the office was  negative. Given these findings, it was felt that he needed to go to the  operating room for left retrograde stone manipulation and left stent  placement with a plan of relieving obstruction, treating any infection,  and plan for ESWL in a staged fashion at a later date. This was  discussed with the patient, and he understood and agreed to proceed. The stone is visible on KUB. DESCRIPTION OF PROCEDURE:  The patient was brought to the operating  room, underwent general anesthetic. He was placed in the lithotomy  position. His genitalia was prepped and draped in a routine sterile  fashion.   He received preoperative antibiotics,

## 2023-11-22 NOTE — TELEPHONE ENCOUNTER
Pt called to schedule 1 wk follow up and ESWL. Pt has been scheduled for 1 wk flwup. Please contact pt to discuss ESWL. Thank you.

## 2023-11-24 LAB
BACTERIA UR CULT: ABNORMAL
BACTERIA UR CULT: ABNORMAL
ORGANISM: ABNORMAL

## 2023-11-27 ENCOUNTER — TELEPHONE (OUTPATIENT)
Dept: UROLOGY | Age: 58
End: 2023-11-27

## 2023-11-27 ENCOUNTER — HOSPITAL ENCOUNTER (OUTPATIENT)
Dept: PREADMISSION TESTING | Age: 58
Discharge: HOME | End: 2023-12-01
Payer: MEDICAID

## 2023-11-27 VITALS — HEIGHT: 64 IN | BODY MASS INDEX: 31.63 KG/M2 | WEIGHT: 185.3 LBS

## 2023-11-27 DIAGNOSIS — N13.6 PYONEPHROSIS: Primary | ICD-10-CM

## 2023-11-27 LAB
ANION GAP SERPL CALCULATED.3IONS-SCNC: 12 MMOL/L (ref 7–19)
APTT PPP: 32.1 SEC (ref 26–36.2)
BASOPHILS # BLD: 0.1 K/UL (ref 0–0.2)
BASOPHILS NFR BLD: 0.8 % (ref 0–1)
BUN SERPL-MCNC: 23 MG/DL (ref 6–20)
CALCIUM SERPL-MCNC: 10 MG/DL (ref 8.6–10)
CHLORIDE SERPL-SCNC: 106 MMOL/L (ref 98–111)
CLOSURE TME BLD-IMP: NORMAL
CLOSURE TME COLL+EPINEP BLD: 88 SEC (ref 84–176)
CO2 SERPL-SCNC: 21 MMOL/L (ref 22–29)
CREAT SERPL-MCNC: 1.1 MG/DL (ref 0.5–1.2)
EKG P AXIS: 45 DEGREES
EKG P-R INTERVAL: 186 MS
EKG Q-T INTERVAL: 360 MS
EKG QRS DURATION: 82 MS
EKG QTC CALCULATION (BAZETT): 403 MS
EKG T AXIS: 13 DEGREES
EOSINOPHIL # BLD: 0.1 K/UL (ref 0–0.6)
EOSINOPHIL NFR BLD: 1 % (ref 0–5)
ERYTHROCYTE [DISTWIDTH] IN BLOOD BY AUTOMATED COUNT: 12.7 % (ref 11.5–14.5)
GLUCOSE SERPL-MCNC: 101 MG/DL (ref 74–109)
HCT VFR BLD AUTO: 42.5 % (ref 42–52)
HGB BLD-MCNC: 14.3 G/DL (ref 14–18)
IMM GRANULOCYTES # BLD: 0.2 K/UL
INR PPP: 1.22 (ref 0.88–1.18)
LYMPHOCYTES # BLD: 2.2 K/UL (ref 1.1–4.5)
LYMPHOCYTES NFR BLD: 24 % (ref 20–40)
MCH RBC QN AUTO: 32.5 PG (ref 27–31)
MCHC RBC AUTO-ENTMCNC: 33.6 G/DL (ref 33–37)
MCV RBC AUTO: 96.6 FL (ref 80–94)
MONOCYTES # BLD: 1 K/UL (ref 0–0.9)
MONOCYTES NFR BLD: 10.7 % (ref 0–10)
NEUTROPHILS # BLD: 5.6 K/UL (ref 1.5–7.5)
NEUTS SEG NFR BLD: 61.7 % (ref 50–65)
PLATELET # BLD AUTO: 354 K/UL (ref 130–400)
PMV BLD AUTO: 9.4 FL (ref 9.4–12.4)
POTASSIUM SERPL-SCNC: 4.6 MMOL/L (ref 3.5–5)
PROTHROMBIN TIME: 15 SEC (ref 12–14.6)
RBC # BLD AUTO: 4.4 M/UL (ref 4.7–6.1)
SODIUM SERPL-SCNC: 139 MMOL/L (ref 136–145)
WBC # BLD AUTO: 9.1 K/UL (ref 4.8–10.8)

## 2023-11-27 PROCEDURE — 85730 THROMBOPLASTIN TIME PARTIAL: CPT

## 2023-11-27 PROCEDURE — 80048 BASIC METABOLIC PNL TOTAL CA: CPT

## 2023-11-27 PROCEDURE — 93005 ELECTROCARDIOGRAM TRACING: CPT | Performed by: UROLOGY

## 2023-11-27 PROCEDURE — 85576 BLOOD PLATELET AGGREGATION: CPT

## 2023-11-27 PROCEDURE — 85610 PROTHROMBIN TIME: CPT

## 2023-11-27 PROCEDURE — 85025 COMPLETE CBC W/AUTO DIFF WBC: CPT

## 2023-11-27 PROCEDURE — 93010 ELECTROCARDIOGRAM REPORT: CPT | Performed by: INTERNAL MEDICINE

## 2023-11-27 RX ORDER — AMOXICILLIN 500 MG/1
500 CAPSULE ORAL 3 TIMES DAILY
Qty: 30 CAPSULE | Refills: 0 | Status: SHIPPED | OUTPATIENT
Start: 2023-11-27 | End: 2023-12-07

## 2023-11-27 RX ORDER — LEVOFLOXACIN 5 MG/ML
500 INJECTION, SOLUTION INTRAVENOUS
Status: CANCELLED | OUTPATIENT
Start: 2023-11-28 | End: 2023-11-28

## 2023-11-27 NOTE — DISCHARGE INSTRUCTIONS
unless you are accompanied by a        responsible adult. On returning home, be sure to follow your physician's orders regarding diet, activity and medications. Remember, surgery with general anesthesia or sedation may leave you sleepy, very tired and with a decreased appetite for 12 to 24 hours. If you develop any post-surgical complications or problems, call your surgeon or [facility] Emergency Department ([phone]). [address] [facility] for Surgery Patients-Revised 6-    Visitors for surgery patients are essential for the patient's emotional well-being and care       post operatively. 2.   Visitor Expectations and Limitations          3. One visitor allowed with patients in the preop/postop rooms. 4.  A second visitor may sit in the waiting area. 5.  No children under 13 allowed in the pre-post op areas unless they are the patient. 6.  Two people may be with an underage surgical/procedural patient in preop/postop        room. 7.  If you are admitted to the hospital post operatively, there are NO RESTRICTIONS on       the floor at this time. 8.  If you are admitted to ICU postoperatively, you may have one visitor in the room from        7A-7P. A second visitor may sit in the ICU waiting room. No overnight visitors in         ICU waiting room.

## 2023-11-27 NOTE — TELEPHONE ENCOUNTER
I called the patient and verified I will cancel that appointment. He understands this, and knows he will get a new post operative appointment at discharge.

## 2023-11-27 NOTE — TELEPHONE ENCOUNTER
Patient requesting return call from nurse to discuss upcoming appt 12/1. Stated he is having procedure tomorrow and asking if needs to keep appt for Friday.  Please call to discuss [phone]      Thank you

## 2023-11-28 ENCOUNTER — APPOINTMENT (OUTPATIENT)
Dept: GENERAL RADIOLOGY | Age: 58
End: 2023-11-28
Attending: UROLOGY
Payer: MEDICAID

## 2023-11-28 ENCOUNTER — HOSPITAL ENCOUNTER (OUTPATIENT)
Age: 58
Setting detail: OUTPATIENT SURGERY
Discharge: HOME | End: 2023-11-28
Attending: UROLOGY | Admitting: UROLOGY
Payer: MEDICAID

## 2023-11-28 ENCOUNTER — ANESTHESIA (OUTPATIENT)
Dept: OPERATING ROOM | Age: 58
End: 2023-11-28
Payer: MEDICAID

## 2023-11-28 ENCOUNTER — ANESTHESIA EVENT (OUTPATIENT)
Dept: OPERATING ROOM | Age: 58
End: 2023-11-28
Payer: MEDICAID

## 2023-11-28 VITALS
TEMPERATURE: 97.4 F | BODY MASS INDEX: 31.58 KG/M2 | RESPIRATION RATE: 20 BRPM | SYSTOLIC BLOOD PRESSURE: 119 MMHG | DIASTOLIC BLOOD PRESSURE: 81 MMHG | HEIGHT: 64 IN | HEART RATE: 67 BPM | OXYGEN SATURATION: 100 % | WEIGHT: 185 LBS

## 2023-11-28 DIAGNOSIS — N20.0 LEFT RENAL STONE: Primary | ICD-10-CM

## 2023-11-28 DIAGNOSIS — N20.1 CALCULUS OF PROXIMAL LEFT URETER: ICD-10-CM

## 2023-11-28 LAB
HCT VFR BLD AUTO: 38.9 % (ref 42–52)
HGB BLD-MCNC: 12.8 G/DL (ref 14–18)

## 2023-11-28 PROCEDURE — 3700000000 HC ANESTHESIA ATTENDED CARE: Performed by: UROLOGY

## 2023-11-28 PROCEDURE — 2709999900 HC NON-CHARGEABLE SUPPLY: Performed by: UROLOGY

## 2023-11-28 PROCEDURE — 6360000002 HC RX W HCPCS: Performed by: ANESTHESIOLOGY

## 2023-11-28 PROCEDURE — 6360000002 HC RX W HCPCS: Performed by: UROLOGY

## 2023-11-28 PROCEDURE — 50590 FRAGMENTING OF KIDNEY STONE: CPT | Performed by: UROLOGY

## 2023-11-28 PROCEDURE — 85018 HEMOGLOBIN: CPT

## 2023-11-28 PROCEDURE — 85014 HEMATOCRIT: CPT

## 2023-11-28 PROCEDURE — 6370000000 HC RX 637 (ALT 250 FOR IP): Performed by: ANESTHESIOLOGY

## 2023-11-28 PROCEDURE — 2500000003 HC RX 250 WO HCPCS: Performed by: NURSE ANESTHETIST, CERTIFIED REGISTERED

## 2023-11-28 PROCEDURE — 2580000003 HC RX 258: Performed by: ANESTHESIOLOGY

## 2023-11-28 PROCEDURE — 6370000000 HC RX 637 (ALT 250 FOR IP): Performed by: UROLOGY

## 2023-11-28 PROCEDURE — 74018 RADEX ABDOMEN 1 VIEW: CPT

## 2023-11-28 PROCEDURE — 7100000000 HC PACU RECOVERY - FIRST 15 MIN: Performed by: UROLOGY

## 2023-11-28 PROCEDURE — 6360000002 HC RX W HCPCS: Performed by: NURSE ANESTHETIST, CERTIFIED REGISTERED

## 2023-11-28 PROCEDURE — 36415 COLL VENOUS BLD VENIPUNCTURE: CPT

## 2023-11-28 PROCEDURE — 7100000001 HC PACU RECOVERY - ADDTL 15 MIN: Performed by: UROLOGY

## 2023-11-28 PROCEDURE — 7100000011 HC PHASE II RECOVERY - ADDTL 15 MIN: Performed by: UROLOGY

## 2023-11-28 PROCEDURE — 7100000010 HC PHASE II RECOVERY - FIRST 15 MIN: Performed by: UROLOGY

## 2023-11-28 PROCEDURE — 3700000001 HC ADD 15 MINUTES (ANESTHESIA): Performed by: UROLOGY

## 2023-11-28 RX ORDER — KETOROLAC TROMETHAMINE 30 MG/ML
INJECTION, SOLUTION INTRAMUSCULAR; INTRAVENOUS PRN
Status: DISCONTINUED | OUTPATIENT
Start: 2023-11-28 | End: 2023-11-28 | Stop reason: SDUPTHER

## 2023-11-28 RX ORDER — HYDROMORPHONE HYDROCHLORIDE 1 MG/ML
0.5 INJECTION, SOLUTION INTRAMUSCULAR; INTRAVENOUS; SUBCUTANEOUS
Status: DISCONTINUED | OUTPATIENT
Start: 2023-11-28 | End: 2023-11-28 | Stop reason: HOSPADM

## 2023-11-28 RX ORDER — FAMOTIDINE 20 MG/1
20 TABLET, FILM COATED ORAL ONCE
Status: COMPLETED | OUTPATIENT
Start: 2023-11-28 | End: 2023-11-28

## 2023-11-28 RX ORDER — HYDROCODONE BITARTRATE AND ACETAMINOPHEN 5; 325 MG/1; MG/1
1 TABLET ORAL EVERY 6 HOURS PRN
Qty: 12 TABLET | Refills: 0 | Status: SHIPPED | OUTPATIENT
Start: 2023-11-28 | End: 2023-12-01

## 2023-11-28 RX ORDER — ONDANSETRON 2 MG/ML
4 INJECTION INTRAMUSCULAR; INTRAVENOUS
Status: DISCONTINUED | OUTPATIENT
Start: 2023-11-28 | End: 2023-11-28 | Stop reason: HOSPADM

## 2023-11-28 RX ORDER — LEVOFLOXACIN 5 MG/ML
500 INJECTION, SOLUTION INTRAVENOUS
Status: COMPLETED | OUTPATIENT
Start: 2023-11-28 | End: 2023-11-28

## 2023-11-28 RX ORDER — ONDANSETRON 2 MG/ML
INJECTION INTRAMUSCULAR; INTRAVENOUS PRN
Status: DISCONTINUED | OUTPATIENT
Start: 2023-11-28 | End: 2023-11-28 | Stop reason: SDUPTHER

## 2023-11-28 RX ORDER — SCOLOPAMINE TRANSDERMAL SYSTEM 1 MG/1
1 PATCH, EXTENDED RELEASE TRANSDERMAL
Status: DISCONTINUED | OUTPATIENT
Start: 2023-11-28 | End: 2023-11-28 | Stop reason: HOSPADM

## 2023-11-28 RX ORDER — OXYCODONE HYDROCHLORIDE AND ACETAMINOPHEN 5; 325 MG/1; MG/1
2 TABLET ORAL EVERY 4 HOURS PRN
Status: DISCONTINUED | OUTPATIENT
Start: 2023-11-28 | End: 2023-11-28 | Stop reason: HOSPADM

## 2023-11-28 RX ORDER — APREPITANT 40 MG/1
40 CAPSULE ORAL ONCE
Status: COMPLETED | OUTPATIENT
Start: 2023-11-28 | End: 2023-11-28

## 2023-11-28 RX ORDER — MIDAZOLAM HYDROCHLORIDE 2 MG/2ML
2 INJECTION, SOLUTION INTRAMUSCULAR; INTRAVENOUS
Status: COMPLETED | OUTPATIENT
Start: 2023-11-28 | End: 2023-11-28

## 2023-11-28 RX ORDER — SODIUM CHLORIDE 0.9 % (FLUSH) 0.9 %
5-40 SYRINGE (ML) INJECTION PRN
Status: DISCONTINUED | OUTPATIENT
Start: 2023-11-28 | End: 2023-11-28 | Stop reason: HOSPADM

## 2023-11-28 RX ORDER — FENTANYL CITRATE 50 UG/ML
25 INJECTION, SOLUTION INTRAMUSCULAR; INTRAVENOUS EVERY 5 MIN PRN
Status: DISCONTINUED | OUTPATIENT
Start: 2023-11-28 | End: 2023-11-28 | Stop reason: HOSPADM

## 2023-11-28 RX ORDER — ONDANSETRON 2 MG/ML
4 INJECTION INTRAMUSCULAR; INTRAVENOUS EVERY 4 HOURS PRN
Status: DISCONTINUED | OUTPATIENT
Start: 2023-11-28 | End: 2023-11-28 | Stop reason: HOSPADM

## 2023-11-28 RX ORDER — FENTANYL CITRATE 50 UG/ML
INJECTION, SOLUTION INTRAMUSCULAR; INTRAVENOUS PRN
Status: DISCONTINUED | OUTPATIENT
Start: 2023-11-28 | End: 2023-11-28 | Stop reason: SDUPTHER

## 2023-11-28 RX ORDER — ALFUZOSIN HYDROCHLORIDE 10 MG/1
10 TABLET, EXTENDED RELEASE ORAL ONCE
Status: COMPLETED | OUTPATIENT
Start: 2023-11-28 | End: 2023-11-28

## 2023-11-28 RX ORDER — LIDOCAINE HYDROCHLORIDE 10 MG/ML
1 INJECTION, SOLUTION EPIDURAL; INFILTRATION; INTRACAUDAL; PERINEURAL
Status: DISCONTINUED | OUTPATIENT
Start: 2023-11-28 | End: 2023-11-28 | Stop reason: HOSPADM

## 2023-11-28 RX ORDER — SODIUM CHLORIDE 9 MG/ML
INJECTION, SOLUTION INTRAVENOUS CONTINUOUS
Status: DISCONTINUED | OUTPATIENT
Start: 2023-11-28 | End: 2023-11-28 | Stop reason: HOSPADM

## 2023-11-28 RX ORDER — SODIUM CHLORIDE 9 MG/ML
INJECTION, SOLUTION INTRAVENOUS PRN
Status: DISCONTINUED | OUTPATIENT
Start: 2023-11-28 | End: 2023-11-28 | Stop reason: HOSPADM

## 2023-11-28 RX ORDER — SODIUM CHLORIDE, SODIUM LACTATE, POTASSIUM CHLORIDE, CALCIUM CHLORIDE 600; 310; 30; 20 MG/100ML; MG/100ML; MG/100ML; MG/100ML
INJECTION, SOLUTION INTRAVENOUS CONTINUOUS
Status: DISCONTINUED | OUTPATIENT
Start: 2023-11-28 | End: 2023-11-28 | Stop reason: HOSPADM

## 2023-11-28 RX ORDER — DEXAMETHASONE SODIUM PHOSPHATE 4 MG/ML
INJECTION, SOLUTION INTRA-ARTICULAR; INTRALESIONAL; INTRAMUSCULAR; INTRAVENOUS; SOFT TISSUE PRN
Status: DISCONTINUED | OUTPATIENT
Start: 2023-11-28 | End: 2023-11-28 | Stop reason: SDUPTHER

## 2023-11-28 RX ORDER — SODIUM CHLORIDE 0.9 % (FLUSH) 0.9 %
5-40 SYRINGE (ML) INJECTION EVERY 12 HOURS SCHEDULED
Status: DISCONTINUED | OUTPATIENT
Start: 2023-11-28 | End: 2023-11-28 | Stop reason: HOSPADM

## 2023-11-28 RX ORDER — PROPOFOL 10 MG/ML
INJECTION, EMULSION INTRAVENOUS PRN
Status: DISCONTINUED | OUTPATIENT
Start: 2023-11-28 | End: 2023-11-28 | Stop reason: SDUPTHER

## 2023-11-28 RX ORDER — ROCURONIUM BROMIDE 10 MG/ML
INJECTION, SOLUTION INTRAVENOUS PRN
Status: DISCONTINUED | OUTPATIENT
Start: 2023-11-28 | End: 2023-11-28 | Stop reason: SDUPTHER

## 2023-11-28 RX ORDER — ACETAMINOPHEN 325 MG/1
650 TABLET ORAL
Status: DISCONTINUED | OUTPATIENT
Start: 2023-11-28 | End: 2023-11-28 | Stop reason: HOSPADM

## 2023-11-28 RX ORDER — LIDOCAINE HYDROCHLORIDE 10 MG/ML
INJECTION, SOLUTION EPIDURAL; INFILTRATION; INTRACAUDAL; PERINEURAL PRN
Status: DISCONTINUED | OUTPATIENT
Start: 2023-11-28 | End: 2023-11-28 | Stop reason: SDUPTHER

## 2023-11-28 RX ADMIN — ALFUZOSIN HYDROCHLORIDE 10 MG: 10 TABLET, FILM COATED, EXTENDED RELEASE ORAL at 15:55

## 2023-11-28 RX ADMIN — SODIUM CHLORIDE, SODIUM LACTATE, POTASSIUM CHLORIDE, AND CALCIUM CHLORIDE: 600; 310; 30; 20 INJECTION, SOLUTION INTRAVENOUS at 11:59

## 2023-11-28 RX ADMIN — PROPOFOL 150 MG: 10 INJECTION, EMULSION INTRAVENOUS at 14:02

## 2023-11-28 RX ADMIN — DEXAMETHASONE SODIUM PHOSPHATE 6 MG: 4 INJECTION, SOLUTION INTRAMUSCULAR; INTRAVENOUS at 14:23

## 2023-11-28 RX ADMIN — APREPITANT 40 MG: 40 CAPSULE ORAL at 12:18

## 2023-11-28 RX ADMIN — KETOROLAC TROMETHAMINE 30 MG: 30 INJECTION, SOLUTION INTRAMUSCULAR; INTRAVENOUS at 15:14

## 2023-11-28 RX ADMIN — FENTANYL CITRATE 50 MCG: 0.05 INJECTION, SOLUTION INTRAMUSCULAR; INTRAVENOUS at 15:11

## 2023-11-28 RX ADMIN — ONDANSETRON 4 MG: 2 INJECTION INTRAMUSCULAR; INTRAVENOUS at 15:01

## 2023-11-28 RX ADMIN — PROPOFOL 100 MCG/KG/MIN: 10 INJECTION, EMULSION INTRAVENOUS at 14:08

## 2023-11-28 RX ADMIN — SUGAMMADEX 200 MG: 100 INJECTION, SOLUTION INTRAVENOUS at 14:50

## 2023-11-28 RX ADMIN — FAMOTIDINE 20 MG: 20 TABLET ORAL at 12:18

## 2023-11-28 RX ADMIN — LEVOFLOXACIN 500 MG: 5 INJECTION, SOLUTION INTRAVENOUS at 14:09

## 2023-11-28 RX ADMIN — FENTANYL CITRATE 50 MCG: 0.05 INJECTION, SOLUTION INTRAMUSCULAR; INTRAVENOUS at 14:02

## 2023-11-28 RX ADMIN — ROCURONIUM BROMIDE 50 MG: 10 INJECTION, SOLUTION INTRAVENOUS at 14:02

## 2023-11-28 RX ADMIN — MIDAZOLAM HYDROCHLORIDE 2 MG: 1 INJECTION, SOLUTION INTRAMUSCULAR; INTRAVENOUS at 13:54

## 2023-11-28 RX ADMIN — LIDOCAINE HYDROCHLORIDE 50 MG: 10 INJECTION, SOLUTION EPIDURAL; INFILTRATION; INTRACAUDAL; PERINEURAL at 14:02

## 2023-11-28 ASSESSMENT — PAIN - FUNCTIONAL ASSESSMENT: PAIN_FUNCTIONAL_ASSESSMENT: 0-10

## 2023-11-28 ASSESSMENT — PAIN DESCRIPTION - DESCRIPTORS: DESCRIPTORS: ACHING

## 2023-11-28 ASSESSMENT — LIFESTYLE VARIABLES: SMOKING_STATUS: 1

## 2023-11-28 NOTE — OP NOTE
Brief Operative Note      Patient: Tabitha Marx  YOB: 1965  MRN: [id number]    Date of Procedure: 11/28/2023    Pre-Op Diagnosis Codes:     * Left renal stone [N20.0]    Post-Op Diagnosis: Same       Procedure(s):  LEFT RENAL EXTRACORPOREAL SHOCK WAVE LITHOTRIPSY,    Surgeon(s):  Jay Ho MD    Assistant:   * No surgical staff found *    Anesthesia: General    Estimated Blood Loss (mL): 0    Complications: None    Specimens:   * No specimens in log *    Implants:  * No implants in log *      Drains: * No LDAs found *    Findings: 3000 shockwaves power level to 7 stone appeared to almost nearly disintegrated with good fragmentation. However there is dust or shadowing primarily in the lower pole with a stone was located at. Stent was left in place. Patient will need follow-up KUB to make sure there is no large stone fragments prior to stent removal he will continue antibiotic to cover Enterococcus        Detailed Description of Procedure:   See dictated report: [id number]    Disposition to PACU then to op care outpatient follow-up in 2 weeks with a KUB.   For the stone is well fragmented and there is no large stone fragments he can be set up for stent removal.    Electronically signed by Raven Martinez MD on 11/28/2023 at 3:03 PM

## 2023-11-28 NOTE — INTERVAL H&P NOTE
Update History & Physical    The patient's History and Physical of November 21, 2023 was reviewed with the patient and I examined the patient. There was no change. Pt was here last week he was not able to get ESWL. He has RSM and stent placed. Urine for Kidney C&S positive for Enterococcus. He has been on Cefdiner. He was call yesterday and started on Amoxicillin. Plan for ESWL and continue Amoxicillin, continue stent until f/u. The surgical site was confirmed by the patient and me. Plan: The risks, benefits, expected outcome, and alternative to the recommended procedure have been discussed with the patient. Patient understands and wants to proceed with the procedure.      Electronically signed by Justine Peoples MD on 11/28/2023 at 12:55 PM

## 2023-11-29 ENCOUNTER — TELEPHONE (OUTPATIENT)
Dept: UROLOGY | Age: 58
End: 2023-11-29

## 2023-11-29 NOTE — OP NOTE
[facility] [address], [address] [place] [address]                                OPERATIVE REPORT    PATIENT NAME: Khushbu Baker                      :        1965  MED REC NO:   [id number]                              ROOM:  ACCOUNT NO:   [de-identified]                           ADMIT DATE: 2023  PROVIDER:     Jennifer Cuello MD    DATE OF PROCEDURE:  2023    TITLE OF OPERATION:  Left renal ESWL. PREOPERATIVE DIAGNOSIS:  Left renal calculus. POSTOPERATIVE DIAGNOSIS:  Left renal calculus. ATTENDING SURGEON:  Dr. Jennifer Cuello. ANESTHETIC:  General anesthetic. ESTIMATED BLOOD LOSS:  0 mL. HISTORY:  The patient is a 62-year-old gentleman who presented to his  local emergency room last week with a 12 mm stone in the left UPJ. He  was brought to the operating room here on 2023, where he underwent  retrograde stone ablation and stent placement. He had some cloudy urine  from the left kidney and this was sent for culture and has grown out  enterococcus and he has been covered with appropriate antibiotics. He  now presents to undergo left renal ESWL. Preop KUB does show the stone  now in the lower pole. Risks and complications have been discussed with  him. He does understand we will probably leave the stent in place  because of the infection and the amount of stone burden just to make  sure the stone is well fragmented and that he has completed a course of  antibiotics prior to stent removal.  The other risks include the risk of  failure to fragment the stone, need for subsequent adjuvant or repeat  procedures. He does understand he has to pass the stone fragments and  if these become obstructive, he may require additional intervention. We  discussed the risk of perinephric hematoma. DESCRIPTION OF PROCEDURE:  The patient was brought to the operating  room, placed on the [employer] F2 lithotripsy unit.   He underwent

## 2023-12-08 NOTE — H&P (VIEW-ONLY)
Prem Canseco is a 58 y.o., male, Established patient who presents today   Chief Complaint   Patient presents with    Follow-up     I am here today for my follow up. KUB done. I have had some diarrhea and back aching after the stent was placed, and I'm very uncomfortable. I did not notice a string attached.       Patient presents after left ESWL on 11/28/2023.  He was originally seen on 11/22/2023 with a 12 mm stone in the left UPJ.  On that day, he underwent retrograde stone manipulation and left ureteral stent placement.  Because he had some cloudy urine from his kidney, he was treated with culture specific antibiotics prior to proceeding with ESWL.  Since his most recent surgery, he has been doing well, but does have some mild stent pain.  In reviewing Dr. Gates's operative note, it does appear that the stone was fragmented as best as it could be in the operating room.  A stent was left in place because there was a continued shadow after ESWL.  Patient presents with KUB today to determine further intervention.    REVIEW OF SYSTEMS:  Review of Systems   Constitutional:  Negative for chills and fever.   Gastrointestinal:  Negative for abdominal distention, abdominal pain, nausea and vomiting.   Genitourinary:  Positive for flank pain. Negative for difficulty urinating, dysuria, frequency, hematuria and urgency.   Musculoskeletal:  Positive for gait problem.   Neurological:  Positive for weakness.   Psychiatric/Behavioral:  Negative for agitation and confusion.        Past Medical History:   Diagnosis Date    Arthrogryposis     congenital    GERD (gastroesophageal reflux disease)     Hx of blood clots     in30's    Hypertension     Kidney stone     Kidney stones     Migraines     PONV (postoperative nausea and vomiting)     Pulmonary embolus (HCC)     \"years ago\"    Seizure (HCC)     hx of x1; weaned off meds per neurologist    Thyroid disease     Graves disease       Past Surgical History:   Procedure Laterality

## 2023-12-08 NOTE — PROGRESS NOTES
Date    APPENDECTOMY      CERVICAL FUSION  2021    CYSTOSCOPY Left 10/8/2019    CYSTOSCOPY LEFT URETEROSCOPY LASER LITHOTRIPSY, STONE REMOVAL performed by Nikhil Osorio MD at [facility] Left 10/8/2019    PLACEMENT OF DOUBLE J STENT performed by Nikhil Osorio MD at [facility] Left 11/21/2023    CYSTOSCOPY RETROGRADE PYLEOGRAMS STONE MANIPULATION performed by Min Galdamez MD at [facility] Left 11/21/2023    URETERAL STENT PLACEMENT-LEFT performed by Min Galdamez MD at [facility] [address] [facility]      rtk    LITHOTRIPSY Right 7/30/2020    RIGHT EXTRACORPOREAL SHOCK WAVE LITHOTRIPSY performed by Min Galdamez MD at [address] OR    LITHOTRIPSY Left 11/28/2023    LEFT RENAL EXTRACORPOREAL SHOCK WAVE LITHOTRIPSY, performed by Min Galdamez MD at [address] OR       Current Outpatient Medications   Medication Sig Dispense Refill    phenazopyridine (PYRIDIUM) 100 MG tablet Take 1 tablet by mouth 3 times daily as needed for Pain (for burning and spasms) 30 tablet 1    tamsulosin (FLOMAX) 0.4 MG capsule Take 1 capsule by mouth daily 90 capsule 3    lisinopril (PRINIVIL;ZESTRIL) 10 MG tablet Take 1 tablet by mouth daily      pantoprazole (PROTONIX) 40 MG tablet Take 1 tablet by mouth daily      FLUoxetine (PROZAC) 20 MG capsule Take 1 capsule by mouth daily      D 5000 5000 units CAPS capsule Take 1 capsule by mouth daily      topiramate (TOPAMAX) 200 MG tablet Take 1 tablet by mouth daily Indications: migraines       No current facility-administered medications for this visit.        Allergies   Allergen Reactions    Latex Rash    Sulfa Antibiotics Other (See Comments)     Extreme skin irritation and peeling of genital area    Codeine Rash       Social History     Socioeconomic History    Marital status:      Spouse name: None    Number of children: None    Years of education: None    Highest education level: None   Tobacco Use

## 2023-12-11 ENCOUNTER — HOSPITAL ENCOUNTER (OUTPATIENT)
Dept: GENERAL RADIOLOGY | Age: 58
Discharge: HOME | End: 2023-12-11
Payer: MEDICAID

## 2023-12-11 ENCOUNTER — PREP FOR PROCEDURE (OUTPATIENT)
Dept: UROLOGY | Age: 58
End: 2023-12-11

## 2023-12-11 ENCOUNTER — OFFICE VISIT (OUTPATIENT)
Dept: UROLOGY | Age: 58
End: 2023-12-11
Payer: MEDICAID

## 2023-12-11 VITALS — TEMPERATURE: 98.2 F | BODY MASS INDEX: 32.3 KG/M2 | HEIGHT: 64 IN | WEIGHT: 189.2 LBS

## 2023-12-11 DIAGNOSIS — N20.0 LEFT RENAL STONE: ICD-10-CM

## 2023-12-11 DIAGNOSIS — N20.0 RENAL CALCULUS OR STONE: ICD-10-CM

## 2023-12-11 DIAGNOSIS — Z96.0 RETAINED URETERAL STENT: ICD-10-CM

## 2023-12-11 DIAGNOSIS — N20.0 LEFT RENAL STONE: Primary | ICD-10-CM

## 2023-12-11 DIAGNOSIS — Z96.0 URETERAL STENT RETAINED: ICD-10-CM

## 2023-12-11 PROBLEM — M48.02 STENOSIS, CERVICAL SPINE: Status: ACTIVE | Noted: 2021-12-17

## 2023-12-11 LAB
BACTERIA URINE, POC: 0
BILIRUBIN URINE: 0 MG/DL
BLOOD, URINE: POSITIVE
CASTS URINE, POC: 0
CLARITY: CLEAR
COLOR: YELLOW
CRYSTALS URINE, POC: 0
EPI CELLS URINE, POC: 0
GLUCOSE URINE: ABNORMAL
KETONES, URINE: NEGATIVE
LEUKOCYTE EST, POC: ABNORMAL
NITRITE, URINE: NEGATIVE
PH UA: 6.5 (ref 4.5–8)
PROTEIN UA: ABNORMAL
RBC URINE, POC: ABNORMAL
SPECIFIC GRAVITY UA: 1.02 (ref 1–1.03)
UROBILINOGEN, URINE: NORMAL
WBC URINE, POC: 7
YEAST URINE, POC: 0

## 2023-12-11 PROCEDURE — 99024 POSTOP FOLLOW-UP VISIT: CPT | Performed by: NURSE PRACTITIONER

## 2023-12-11 PROCEDURE — 74018 RADEX ABDOMEN 1 VIEW: CPT

## 2023-12-11 PROCEDURE — 81001 URINALYSIS AUTO W/SCOPE: CPT | Performed by: NURSE PRACTITIONER

## 2023-12-11 ASSESSMENT — ENCOUNTER SYMPTOMS
VOMITING: 0
ABDOMINAL PAIN: 0
ABDOMINAL DISTENTION: 0
NAUSEA: 0

## 2023-12-15 LAB
APPEARANCE STONE: NORMAL
COMPN STONE: NORMAL
SPECIMEN WT: 199 MG

## 2023-12-28 ENCOUNTER — HOSPITAL ENCOUNTER (OUTPATIENT)
Dept: PREADMISSION TESTING | Age: 58
Discharge: HOME | End: 2024-01-01
Payer: MEDICAID

## 2023-12-28 VITALS — BODY MASS INDEX: 32.27 KG/M2 | WEIGHT: 188 LBS

## 2023-12-28 LAB
ANION GAP SERPL CALCULATED.3IONS-SCNC: 10 MMOL/L (ref 7–19)
BASOPHILS # BLD: 0.1 K/UL (ref 0–0.2)
BASOPHILS NFR BLD: 1.2 % (ref 0–1)
BUN SERPL-MCNC: 24 MG/DL (ref 6–20)
CALCIUM SERPL-MCNC: 9.5 MG/DL (ref 8.6–10)
CHLORIDE SERPL-SCNC: 109 MMOL/L (ref 98–111)
CO2 SERPL-SCNC: 23 MMOL/L (ref 22–29)
CREAT SERPL-MCNC: 1.1 MG/DL (ref 0.5–1.2)
EOSINOPHIL # BLD: 0.2 K/UL (ref 0–0.6)
EOSINOPHIL NFR BLD: 2.7 % (ref 0–5)
ERYTHROCYTE [DISTWIDTH] IN BLOOD BY AUTOMATED COUNT: 13.1 % (ref 11.5–14.5)
GLUCOSE SERPL-MCNC: 92 MG/DL (ref 74–109)
HCT VFR BLD AUTO: 43.7 % (ref 42–52)
HGB BLD-MCNC: 14.6 G/DL (ref 14–18)
IMM GRANULOCYTES # BLD: 0 K/UL
LYMPHOCYTES # BLD: 1.8 K/UL (ref 1.1–4.5)
LYMPHOCYTES NFR BLD: 31.1 % (ref 20–40)
MCH RBC QN AUTO: 32.4 PG (ref 27–31)
MCHC RBC AUTO-ENTMCNC: 33.4 G/DL (ref 33–37)
MCV RBC AUTO: 96.9 FL (ref 80–94)
MONOCYTES # BLD: 0.7 K/UL (ref 0–0.9)
MONOCYTES NFR BLD: 12.8 % (ref 0–10)
NEUTROPHILS # BLD: 2.9 K/UL (ref 1.5–7.5)
NEUTS SEG NFR BLD: 51.8 % (ref 50–65)
PLATELET # BLD AUTO: 233 K/UL (ref 130–400)
PMV BLD AUTO: 9.8 FL (ref 9.4–12.4)
POTASSIUM SERPL-SCNC: 4.7 MMOL/L (ref 3.5–5)
RBC # BLD AUTO: 4.51 M/UL (ref 4.7–6.1)
SODIUM SERPL-SCNC: 142 MMOL/L (ref 136–145)
WBC # BLD AUTO: 5.6 K/UL (ref 4.8–10.8)

## 2023-12-28 PROCEDURE — 80048 BASIC METABOLIC PNL TOTAL CA: CPT

## 2023-12-28 PROCEDURE — 85025 COMPLETE CBC W/AUTO DIFF WBC: CPT

## 2023-12-28 RX ORDER — LEVOFLOXACIN 5 MG/ML
500 INJECTION, SOLUTION INTRAVENOUS ONCE
Status: CANCELLED | OUTPATIENT
Start: 2024-01-02

## 2023-12-28 NOTE — DISCHARGE INSTRUCTIONS
you do not receive a phone call by 4 PM the day before your surgery please call [phone] and let them know you have not received an arrival time. If your surgery is on Monday, your call will be on the Friday before your Monday surgery.        MEDICATION INSTRUCTIONS PRIOR TO YOUR SURGERY      The morning of surgery:    You can take all your usual prescribed medications with a small sip of water.      DO NOT TAKE ANY DIABETIC MEDICATIONS the morning of your surgery.    DO NOT TAKE ANY SUPPLEMENTS or over the counter medications the morning of  surgery.      [facility] Visitor Policy for Surgery Patients-Revised 6-    Visitors for surgery patients are essential for the patient's emotional well-being and care       post operatively.    2.   Visitor Expectations and Limitations      3.  One visitor allowed with patients in the preop/postop rooms.    4.  A second visitor may sit in the waiting area.    5.  No children under 13 allowed in the pre-post op areas unless they are the patient.    6.  Two people may be with an underage surgical/procedural patient in preop/postop        room.      7.  If you are admitted to the hospital post operatively, there are NO RESTRICTIONS on       the floor at this time.      8.  If you are admitted to ICU postoperatively, you may have one visitor in the room from        7A-7P.  A second visitor may sit in the ICU waiting room.  No overnight visitors in         ICU waiting room.

## 2024-01-02 ENCOUNTER — HOSPITAL ENCOUNTER (OUTPATIENT)
Age: 59
Setting detail: OUTPATIENT SURGERY
Discharge: HOME | End: 2024-01-02
Attending: UROLOGY | Admitting: UROLOGY
Payer: MEDICAID

## 2024-01-02 ENCOUNTER — APPOINTMENT (OUTPATIENT)
Dept: GENERAL RADIOLOGY | Age: 59
End: 2024-01-02
Attending: UROLOGY
Payer: MEDICAID

## 2024-01-02 ENCOUNTER — ANESTHESIA EVENT (OUTPATIENT)
Dept: OPERATING ROOM | Age: 59
End: 2024-01-02
Payer: MEDICAID

## 2024-01-02 ENCOUNTER — ANESTHESIA (OUTPATIENT)
Dept: OPERATING ROOM | Age: 59
End: 2024-01-02
Payer: MEDICAID

## 2024-01-02 VITALS
HEIGHT: 64 IN | HEART RATE: 57 BPM | SYSTOLIC BLOOD PRESSURE: 121 MMHG | BODY MASS INDEX: 32.1 KG/M2 | TEMPERATURE: 96.9 F | OXYGEN SATURATION: 100 % | DIASTOLIC BLOOD PRESSURE: 77 MMHG | WEIGHT: 188 LBS | RESPIRATION RATE: 16 BRPM

## 2024-01-02 DIAGNOSIS — N20.0 LEFT RENAL STONE: ICD-10-CM

## 2024-01-02 DIAGNOSIS — Z96.0 URETERAL STENT RETAINED: ICD-10-CM

## 2024-01-02 PROCEDURE — 7100000001 HC PACU RECOVERY - ADDTL 15 MIN: Performed by: UROLOGY

## 2024-01-02 PROCEDURE — 2500000003 HC RX 250 WO HCPCS: Performed by: NURSE ANESTHETIST, CERTIFIED REGISTERED

## 2024-01-02 PROCEDURE — 7100000000 HC PACU RECOVERY - FIRST 15 MIN: Performed by: UROLOGY

## 2024-01-02 PROCEDURE — 6360000002 HC RX W HCPCS: Performed by: NURSE PRACTITIONER

## 2024-01-02 PROCEDURE — 7100000011 HC PHASE II RECOVERY - ADDTL 15 MIN: Performed by: UROLOGY

## 2024-01-02 PROCEDURE — 2709999900 HC NON-CHARGEABLE SUPPLY: Performed by: UROLOGY

## 2024-01-02 PROCEDURE — 82360 CALCULUS ASSAY QUANT: CPT

## 2024-01-02 PROCEDURE — 7100000010 HC PHASE II RECOVERY - FIRST 15 MIN: Performed by: UROLOGY

## 2024-01-02 PROCEDURE — 2720000010 HC SURG SUPPLY STERILE: Performed by: UROLOGY

## 2024-01-02 PROCEDURE — C1747 HC ENDOSCOPE, SINGLE, URINARY TRACT: HCPCS | Performed by: UROLOGY

## 2024-01-02 PROCEDURE — C1769 GUIDE WIRE: HCPCS | Performed by: UROLOGY

## 2024-01-02 PROCEDURE — 3700000000 HC ANESTHESIA ATTENDED CARE: Performed by: UROLOGY

## 2024-01-02 PROCEDURE — C1894 INTRO/SHEATH, NON-LASER: HCPCS | Performed by: UROLOGY

## 2024-01-02 PROCEDURE — 74420 UROGRAPHY RTRGR +-KUB: CPT

## 2024-01-02 PROCEDURE — 88300 SURGICAL PATH GROSS: CPT

## 2024-01-02 PROCEDURE — 6370000000 HC RX 637 (ALT 250 FOR IP): Performed by: UROLOGY

## 2024-01-02 PROCEDURE — 3700000001 HC ADD 15 MINUTES (ANESTHESIA): Performed by: UROLOGY

## 2024-01-02 PROCEDURE — 3600000014 HC SURGERY LEVEL 4 ADDTL 15MIN: Performed by: UROLOGY

## 2024-01-02 PROCEDURE — 2580000003 HC RX 258: Performed by: ANESTHESIOLOGY

## 2024-01-02 PROCEDURE — C1758 CATHETER, URETERAL: HCPCS | Performed by: UROLOGY

## 2024-01-02 PROCEDURE — 6360000002 HC RX W HCPCS: Performed by: UROLOGY

## 2024-01-02 PROCEDURE — 6370000000 HC RX 637 (ALT 250 FOR IP): Performed by: ANESTHESIOLOGY

## 2024-01-02 PROCEDURE — 6360000002 HC RX W HCPCS: Performed by: NURSE ANESTHETIST, CERTIFIED REGISTERED

## 2024-01-02 PROCEDURE — 3600000004 HC SURGERY LEVEL 4 BASE: Performed by: UROLOGY

## 2024-01-02 PROCEDURE — 6360000002 HC RX W HCPCS: Performed by: ANESTHESIOLOGY

## 2024-01-02 RX ORDER — ONDANSETRON 2 MG/ML
INJECTION INTRAMUSCULAR; INTRAVENOUS PRN
Status: DISCONTINUED | OUTPATIENT
Start: 2024-01-02 | End: 2024-01-02 | Stop reason: SDUPTHER

## 2024-01-02 RX ORDER — APREPITANT 40 MG/1
40 CAPSULE ORAL ONCE
Status: COMPLETED | OUTPATIENT
Start: 2024-01-02 | End: 2024-01-02

## 2024-01-02 RX ORDER — KETOROLAC TROMETHAMINE 30 MG/ML
15 INJECTION, SOLUTION INTRAMUSCULAR; INTRAVENOUS ONCE
Status: COMPLETED | OUTPATIENT
Start: 2024-01-02 | End: 2024-01-02

## 2024-01-02 RX ORDER — MIDAZOLAM HYDROCHLORIDE 2 MG/2ML
2 INJECTION, SOLUTION INTRAMUSCULAR; INTRAVENOUS
Status: DISCONTINUED | OUTPATIENT
Start: 2024-01-02 | End: 2024-01-02 | Stop reason: HOSPADM

## 2024-01-02 RX ORDER — OXYCODONE HYDROCHLORIDE AND ACETAMINOPHEN 5; 325 MG/1; MG/1
1 TABLET ORAL EVERY 6 HOURS PRN
Qty: 12 TABLET | Refills: 0 | Status: SHIPPED | OUTPATIENT
Start: 2024-01-02 | End: 2024-01-05

## 2024-01-02 RX ORDER — HYDROMORPHONE HYDROCHLORIDE 1 MG/ML
0.5 INJECTION, SOLUTION INTRAMUSCULAR; INTRAVENOUS; SUBCUTANEOUS
Status: DISCONTINUED | OUTPATIENT
Start: 2024-01-02 | End: 2024-01-02 | Stop reason: HOSPADM

## 2024-01-02 RX ORDER — HYDRALAZINE HYDROCHLORIDE 20 MG/ML
10 INJECTION INTRAMUSCULAR; INTRAVENOUS
Status: DISCONTINUED | OUTPATIENT
Start: 2024-01-02 | End: 2024-01-02 | Stop reason: HOSPADM

## 2024-01-02 RX ORDER — LIDOCAINE HYDROCHLORIDE 10 MG/ML
INJECTION, SOLUTION EPIDURAL; INFILTRATION; INTRACAUDAL; PERINEURAL PRN
Status: DISCONTINUED | OUTPATIENT
Start: 2024-01-02 | End: 2024-01-02 | Stop reason: SDUPTHER

## 2024-01-02 RX ORDER — ROCURONIUM BROMIDE 10 MG/ML
INJECTION, SOLUTION INTRAVENOUS PRN
Status: DISCONTINUED | OUTPATIENT
Start: 2024-01-02 | End: 2024-01-02 | Stop reason: SDUPTHER

## 2024-01-02 RX ORDER — PROCHLORPERAZINE EDISYLATE 5 MG/ML
5 INJECTION INTRAMUSCULAR; INTRAVENOUS
Status: DISCONTINUED | OUTPATIENT
Start: 2024-01-02 | End: 2024-01-02 | Stop reason: HOSPADM

## 2024-01-02 RX ORDER — FENTANYL CITRATE 50 UG/ML
INJECTION, SOLUTION INTRAMUSCULAR; INTRAVENOUS PRN
Status: DISCONTINUED | OUTPATIENT
Start: 2024-01-02 | End: 2024-01-02 | Stop reason: SDUPTHER

## 2024-01-02 RX ORDER — LEVOFLOXACIN 5 MG/ML
500 INJECTION, SOLUTION INTRAVENOUS ONCE
Status: COMPLETED | OUTPATIENT
Start: 2024-01-02 | End: 2024-01-02

## 2024-01-02 RX ORDER — PROPOFOL 10 MG/ML
INJECTION, EMULSION INTRAVENOUS PRN
Status: DISCONTINUED | OUTPATIENT
Start: 2024-01-02 | End: 2024-01-02 | Stop reason: SDUPTHER

## 2024-01-02 RX ORDER — CIPROFLOXACIN 500 MG/1
500 TABLET, FILM COATED ORAL 2 TIMES DAILY
Qty: 6 TABLET | Refills: 0 | Status: SHIPPED | OUTPATIENT
Start: 2024-01-02 | End: 2024-01-05

## 2024-01-02 RX ORDER — MEPERIDINE HYDROCHLORIDE 25 MG/ML
12.5 INJECTION INTRAMUSCULAR; INTRAVENOUS; SUBCUTANEOUS
Status: DISCONTINUED | OUTPATIENT
Start: 2024-01-02 | End: 2024-01-02 | Stop reason: HOSPADM

## 2024-01-02 RX ORDER — DIPHENHYDRAMINE HYDROCHLORIDE 50 MG/ML
12.5 INJECTION INTRAMUSCULAR; INTRAVENOUS
Status: DISCONTINUED | OUTPATIENT
Start: 2024-01-02 | End: 2024-01-02 | Stop reason: HOSPADM

## 2024-01-02 RX ORDER — SODIUM CHLORIDE 0.9 % (FLUSH) 0.9 %
5-40 SYRINGE (ML) INJECTION EVERY 12 HOURS SCHEDULED
Status: DISCONTINUED | OUTPATIENT
Start: 2024-01-02 | End: 2024-01-02 | Stop reason: HOSPADM

## 2024-01-02 RX ORDER — SCOLOPAMINE TRANSDERMAL SYSTEM 1 MG/1
1 PATCH, EXTENDED RELEASE TRANSDERMAL
Status: DISCONTINUED | OUTPATIENT
Start: 2024-01-02 | End: 2024-01-02 | Stop reason: HOSPADM

## 2024-01-02 RX ORDER — HYDROMORPHONE HYDROCHLORIDE 1 MG/ML
0.5 INJECTION, SOLUTION INTRAMUSCULAR; INTRAVENOUS; SUBCUTANEOUS EVERY 5 MIN PRN
Status: DISCONTINUED | OUTPATIENT
Start: 2024-01-02 | End: 2024-01-02 | Stop reason: HOSPADM

## 2024-01-02 RX ORDER — LIDOCAINE HYDROCHLORIDE 10 MG/ML
1 INJECTION, SOLUTION EPIDURAL; INFILTRATION; INTRACAUDAL; PERINEURAL
Status: DISCONTINUED | OUTPATIENT
Start: 2024-01-02 | End: 2024-01-02 | Stop reason: HOSPADM

## 2024-01-02 RX ORDER — HYDROMORPHONE HYDROCHLORIDE 1 MG/ML
0.25 INJECTION, SOLUTION INTRAMUSCULAR; INTRAVENOUS; SUBCUTANEOUS EVERY 5 MIN PRN
Status: DISCONTINUED | OUTPATIENT
Start: 2024-01-02 | End: 2024-01-02 | Stop reason: HOSPADM

## 2024-01-02 RX ORDER — ALFUZOSIN HYDROCHLORIDE 10 MG/1
10 TABLET, EXTENDED RELEASE ORAL ONCE
Status: COMPLETED | OUTPATIENT
Start: 2024-01-02 | End: 2024-01-02

## 2024-01-02 RX ORDER — ONDANSETRON 2 MG/ML
4 INJECTION INTRAMUSCULAR; INTRAVENOUS EVERY 4 HOURS PRN
Status: DISCONTINUED | OUTPATIENT
Start: 2024-01-02 | End: 2024-01-02 | Stop reason: HOSPADM

## 2024-01-02 RX ORDER — LABETALOL HYDROCHLORIDE 5 MG/ML
10 INJECTION, SOLUTION INTRAVENOUS
Status: DISCONTINUED | OUTPATIENT
Start: 2024-01-02 | End: 2024-01-02 | Stop reason: HOSPADM

## 2024-01-02 RX ORDER — IPRATROPIUM BROMIDE AND ALBUTEROL SULFATE 2.5; .5 MG/3ML; MG/3ML
1 SOLUTION RESPIRATORY (INHALATION)
Status: DISCONTINUED | OUTPATIENT
Start: 2024-01-02 | End: 2024-01-02 | Stop reason: HOSPADM

## 2024-01-02 RX ORDER — SUCCINYLCHOLINE/SOD CL,ISO/PF 100 MG/5ML
SYRINGE (ML) INTRAVENOUS PRN
Status: DISCONTINUED | OUTPATIENT
Start: 2024-01-02 | End: 2024-01-02 | Stop reason: SDUPTHER

## 2024-01-02 RX ORDER — OXYCODONE HYDROCHLORIDE AND ACETAMINOPHEN 5; 325 MG/1; MG/1
2 TABLET ORAL EVERY 4 HOURS PRN
Status: DISCONTINUED | OUTPATIENT
Start: 2024-01-02 | End: 2024-01-02 | Stop reason: HOSPADM

## 2024-01-02 RX ORDER — SODIUM CHLORIDE 9 MG/ML
INJECTION, SOLUTION INTRAVENOUS CONTINUOUS
Status: DISCONTINUED | OUTPATIENT
Start: 2024-01-02 | End: 2024-01-02 | Stop reason: HOSPADM

## 2024-01-02 RX ORDER — SODIUM CHLORIDE 9 MG/ML
INJECTION, SOLUTION INTRAVENOUS PRN
Status: DISCONTINUED | OUTPATIENT
Start: 2024-01-02 | End: 2024-01-02 | Stop reason: HOSPADM

## 2024-01-02 RX ORDER — SODIUM CHLORIDE 0.9 % (FLUSH) 0.9 %
5-40 SYRINGE (ML) INJECTION PRN
Status: DISCONTINUED | OUTPATIENT
Start: 2024-01-02 | End: 2024-01-02 | Stop reason: HOSPADM

## 2024-01-02 RX ORDER — MIDAZOLAM HYDROCHLORIDE 1 MG/ML
INJECTION INTRAMUSCULAR; INTRAVENOUS PRN
Status: DISCONTINUED | OUTPATIENT
Start: 2024-01-02 | End: 2024-01-02 | Stop reason: SDUPTHER

## 2024-01-02 RX ORDER — SODIUM CHLORIDE, SODIUM LACTATE, POTASSIUM CHLORIDE, CALCIUM CHLORIDE 600; 310; 30; 20 MG/100ML; MG/100ML; MG/100ML; MG/100ML
INJECTION, SOLUTION INTRAVENOUS CONTINUOUS
Status: DISCONTINUED | OUTPATIENT
Start: 2024-01-02 | End: 2024-01-02 | Stop reason: HOSPADM

## 2024-01-02 RX ADMIN — MIDAZOLAM 2 MG: 1 INJECTION INTRAMUSCULAR; INTRAVENOUS at 12:33

## 2024-01-02 RX ADMIN — KETOROLAC TROMETHAMINE 15 MG: 30 INJECTION, SOLUTION INTRAMUSCULAR; INTRAVENOUS at 13:45

## 2024-01-02 RX ADMIN — FENTANYL CITRATE 100 MCG: 0.05 INJECTION, SOLUTION INTRAMUSCULAR; INTRAVENOUS at 12:33

## 2024-01-02 RX ADMIN — Medication 160 MG: at 12:33

## 2024-01-02 RX ADMIN — ONDANSETRON 4 MG: 2 INJECTION INTRAMUSCULAR; INTRAVENOUS at 13:10

## 2024-01-02 RX ADMIN — LIDOCAINE HYDROCHLORIDE 50 MG: 10 INJECTION, SOLUTION EPIDURAL; INFILTRATION; INTRACAUDAL; PERINEURAL at 12:33

## 2024-01-02 RX ADMIN — SODIUM CHLORIDE, POTASSIUM CHLORIDE, SODIUM LACTATE AND CALCIUM CHLORIDE: 600; 310; 30; 20 INJECTION, SOLUTION INTRAVENOUS at 10:22

## 2024-01-02 RX ADMIN — SUGAMMADEX 200 MG: 100 INJECTION, SOLUTION INTRAVENOUS at 13:10

## 2024-01-02 RX ADMIN — APREPITANT 40 MG: 40 CAPSULE ORAL at 12:17

## 2024-01-02 RX ADMIN — ALFUZOSIN HYDROCHLORIDE 10 MG: 10 TABLET, FILM COATED, EXTENDED RELEASE ORAL at 13:45

## 2024-01-02 RX ADMIN — ONDANSETRON 4 MG: 2 INJECTION INTRAMUSCULAR; INTRAVENOUS at 13:45

## 2024-01-02 RX ADMIN — ROCURONIUM BROMIDE 25 MG: 10 INJECTION, SOLUTION INTRAVENOUS at 12:33

## 2024-01-02 RX ADMIN — PROPOFOL 200 MG: 10 INJECTION, EMULSION INTRAVENOUS at 12:33

## 2024-01-02 RX ADMIN — LEVOFLOXACIN 500 MG: 5 INJECTION, SOLUTION INTRAVENOUS at 12:35

## 2024-01-02 ASSESSMENT — PAIN - FUNCTIONAL ASSESSMENT
PAIN_FUNCTIONAL_ASSESSMENT: 0-10
PAIN_FUNCTIONAL_ASSESSMENT: NONE - DENIES PAIN
PAIN_FUNCTIONAL_ASSESSMENT: NONE - DENIES PAIN

## 2024-01-02 ASSESSMENT — LIFESTYLE VARIABLES: SMOKING_STATUS: 1

## 2024-01-02 NOTE — OP NOTE
Brief Operative Note      Patient: Prem Canseco  YOB: 1965  MRN: [id number]    Date of Procedure: 1/2/2024    Pre-Op Diagnosis Codes:     * Left renal stone [N20.0]     * Ureteral stent retained [Z96.0]    Post-Op Diagnosis: Same       Procedure(s):  CYSTOSCOPY,  LEFT URETERAL STENT REMOVAL, LEFT URETEROSCOPY, PYELOSCOPY, LASER LITHOTRIPSY, STONE BASKET EXTRACTION    Surgeon(s):  Tila Baker MD    Assistant:   * No surgical staff found *    Anesthesia: General    Estimated Blood Loss (mL): 0    Complications: None    Specimens:   ID Type Source Tests Collected by Time Destination   1 : Left renal calculi Stone (Calculus) Kidney STONE ANALYSIS Tila Baker MD 1/2/2024 1306        Implants:  * No implants in log *      Drains: * No LDAs found *    Findings: 2 distinct separate stones in the lower pole measuring about 3 to 4 mm were extracted with stone basket.  Remaining fine sand size gravel was dusted with the holmium laser and washout of the lower pole.  The past this dust and ambar particles.  Patient was not left.            Detailed Description of Procedure:   See dictated report:  [id number]    Disposition to PACU then to op care outpatient can follow-up in 4 weeks with KUB.  Consider follow-up CT for stone protocol at some point in future    Electronically signed by TILA BAKER MD on 1/2/2024 at 1:26 PM

## 2024-01-02 NOTE — ANESTHESIA PRE PROCEDURE
Department of Anesthesiology  Preprocedure Note       Name:  Prem Canseco   Age:  58 y.o.  :  1965                                          MRN:  [id number]         Date:  2024      Surgeon: Surgeon(s):  Giancarlo Gates MD    Procedure: Procedure(s):  CYSTOSCOPY LEFT URETERAL STENT REMOVAL  CYSTOSCOPY LEFT URETERAL STENT REMOVAL LEFT URETEROSCOPY, PYELOSCOPY, LASER LITHOTRIPSY, STONE BASKET EXTRACTION WITH LEFT URETERAL STENT REPLACEMENT    Medications prior to admission:   Prior to Admission medications    Medication Sig Start Date End Date Taking? Authorizing Provider   phenazopyridine (PYRIDIUM) 100 MG tablet Take 1 tablet by mouth 3 times daily as needed for Pain (for burning and spasms) 23   Giancarlo Gates MD   tamsulosin (FLOMAX) 0.4 MG capsule Take 1 capsule by mouth daily 23  Giancarlo Gates MD   lisinopril (PRINIVIL;ZESTRIL) 10 MG tablet Take 1 tablet by mouth daily 19   Pedro Cramer MD   pantoprazole (PROTONIX) 40 MG tablet Take 1 tablet by mouth daily 19   Pedro Cramer MD   FLUoxetine (PROZAC) 20 MG capsule Take 1 capsule by mouth daily 19   Pedro Cramer MD   D 5000 5000 units CAPS capsule Take 1 capsule by mouth daily 19   Pedro Cramer MD   topiramate (TOPAMAX) 200 MG tablet Take 1 tablet by mouth daily Indications: migraines 19   Pedro Cramer MD       Current medications:    Current Facility-Administered Medications   Medication Dose Route Frequency Provider Last Rate Last Admin   • levoFLOXacin (LEVAQUIN) 500 MG/100ML infusion 500 mg  500 mg IntraVENous Once Barbara Santizo, APRN - CNP       • lactated ringers IV soln infusion   IntraVENous Continuous Eve Villalobos  mL/hr at 24 1022 New Bag at 24 1022       Allergies:    Allergies   Allergen Reactions   • Latex Rash   • Sulfa Antibiotics Other (See Comments)     Extreme skin irritation and peeling of genital area   •

## 2024-01-02 NOTE — INTERVAL H&P NOTE
Update History & Physical    The patient's History and Physical of December 11, 2023 was reviewed with the patient and I examined the patient. There was no change. The surgical site was confirmed by the patient and me.     Plan: The risks, benefits, expected outcome, and alternative to the recommended procedure have been discussed with the patient. Patient understands and wants to proceed with the procedure.     Electronically signed by TILA BAKER MD on 1/2/2024 at 11:42 AM

## 2024-01-02 NOTE — PROGRESS NOTES
CLINICAL PHARMACY NOTE: MEDS TO BEDS    Total # of Prescriptions Filled: 3   The following medications were delivered to the patient:  Discharge Medication List as of 1/2/2024  2:21 PM        START taking these medications    Details   oxyCODONE-acetaminophen (PERCOCET) 5-325 MG per tablet Take 1 tablet by mouth every 6 hours as needed for Pain for up to 3 days. Max Daily Amount: 4 tablets, Disp-12 tablet, R-0Normal      ciprofloxacin (CIPRO) 500 MG tablet Take 1 tablet by mouth 2 times daily for 3 days, Disp-6 tablet, R-0Normal           Free Narcan     Additional Documentation:    Handed scripts to patients wife at [facility]. Zero copay

## 2024-01-02 NOTE — ANESTHESIA POSTPROCEDURE EVALUATION
Department of Anesthesiology  Postprocedure Note    Patient: Prem Canseco  MRN: [id number]  YOB: 1965  Date of evaluation: 1/2/2024    Procedure Summary       Date: 01/02/24 Room / Location: [facility]    Anesthesia Start: 1220 Anesthesia Stop: 1322    Procedure: CYSTOSCOPY,  LEFT URETERAL STENT REMOVAL, LEFT URETEROSCOPY, PYELOSCOPY, LASER LITHOTRIPSY, STONE BASKET EXTRACTION (Left) Diagnosis:       Left renal stone      Ureteral stent retained      (Left renal stone [N20.0])      (Ureteral stent retained [Z96.0])    Surgeons: Giancarlo Gates MD Responsible Provider: Binu Sesay APRN - CRNA    Anesthesia Type: general ASA Status: 2            Anesthesia Type: No value filed.    Fifi Phase I: Fifi Score: 10    Fifi Phase II:      Anesthesia Post Evaluation    Patient location during evaluation: PACU  Patient participation: complete - patient participated  Level of consciousness: sleepy but conscious  Pain score: 0  Airway patency: patent  Nausea & Vomiting: no nausea  Cardiovascular status: hemodynamically stable  Respiratory status: acceptable  Hydration status: euvolemic  Pain management: adequate    No notable events documented.

## 2024-01-02 NOTE — DISCHARGE INSTRUCTIONS
[facility], Dr Gates    Cystoscopy / Ureteroscopy / Bladder Endoscopy Procedures Discharge Instructions      You may experience : Burning sensation when you void     A feeling of a need to go to the bathroom frequently     You may have urgent urination     Your urine may be blood tinged    These symptoms should be relieved within a few hours and days  as you increase the amounts of fluids you drink and the number of times that you empty your bladder. They may persist for 1-2 weeks if you have a stent or had a biopsy or resection.  We recommended that you drink plenty of fluid a few days after surgery.    If you have a ureteral stent he may have pain in your side or back related to the stent. Stents also cause bladder irritation symptoms of frequency and urgency.    No strenuous activities for 1 week or  until you talk to your doctor.    You may feel light headed up to 24 hours after anesthesia.  You should not do the following for the next 24 hours.       Drive a car, operate machinery or power tools     Drink any alcoholic drinks (not even beer or wine)     Make any important decisions, i.e., signing important papers.    It is recommended that you begin with clear liquids and/or light foods.  If you  Are not nauseated, progress to your normal diet.    I you are unable to urinate you should call your surgeon.    Dr. Giancarlo Gates

## 2024-01-05 LAB
APPEARANCE STONE: NORMAL
COMPN STONE: NORMAL
SPECIMEN WT: 78 MG

## 2024-01-30 ENCOUNTER — OFFICE VISIT (OUTPATIENT)
Dept: UROLOGY | Age: 59
End: 2024-01-30
Payer: MEDICAID

## 2024-01-30 ENCOUNTER — HOSPITAL ENCOUNTER (OUTPATIENT)
Dept: GENERAL RADIOLOGY | Age: 59
Discharge: HOME | End: 2024-01-30
Payer: MEDICAID

## 2024-01-30 VITALS — TEMPERATURE: 97.7 F | HEIGHT: 64 IN | WEIGHT: 189 LBS | BODY MASS INDEX: 32.27 KG/M2

## 2024-01-30 DIAGNOSIS — N20.0 LEFT RENAL STONE: ICD-10-CM

## 2024-01-30 DIAGNOSIS — N20.0 RIGHT RENAL STONE: Primary | ICD-10-CM

## 2024-01-30 DIAGNOSIS — R97.20 ELEVATED PSA: ICD-10-CM

## 2024-01-30 LAB
APPEARANCE FLUID: CLEAR
BILIRUBIN, POC: NORMAL
BLOOD URINE, POC: NORMAL
CLARITY, POC: CLEAR
COLOR, POC: YELLOW
GLUCOSE URINE, POC: NORMAL
KETONES, POC: NORMAL
LEUKOCYTE EST, POC: NORMAL
NITRITE, POC: NORMAL
PH, POC: 6.5
PROTEIN, POC: NORMAL
SPECIFIC GRAVITY, POC: 1.02
UROBILINOGEN, POC: 1

## 2024-01-30 PROCEDURE — 74018 RADEX ABDOMEN 1 VIEW: CPT

## 2024-01-30 PROCEDURE — 99212 OFFICE O/P EST SF 10 MIN: CPT | Performed by: NURSE PRACTITIONER

## 2024-01-30 PROCEDURE — 81002 URINALYSIS NONAUTO W/O SCOPE: CPT | Performed by: NURSE PRACTITIONER

## 2024-01-30 ASSESSMENT — ENCOUNTER SYMPTOMS
NAUSEA: 0
VOMITING: 0
ABDOMINAL DISTENTION: 0
ABDOMINAL PAIN: 0
BACK PAIN: 1

## 2024-01-30 NOTE — PROGRESS NOTES
Prem Canseco is a 58 y.o., male, Established patient who presents today   Chief Complaint   Patient presents with    Follow-up     I am here today for my 1 month post op.      The patient presented with a 12 mm left UPJ stone and  underwent retrograde stone manipulation and stent placement on  11/22/2023.  He subsequently was found on KUB for the stone to have  settled in the lower pole and underwent shock wave lithotripsy on  11/28/2023.  The stone did fragment well at that time; however, followup  KUB showed some residual stone fragments in the lower pole of the left  kidney.  He underwent endoscopic management of these fragments on 1/2/2024.  Stent was not replaced.  He reports he has been doing well since surgery without additional issues.  He does have KUB for review today.  Stone analysis revealed calcium phosphate stones.    REVIEW OF SYSTEMS:  Review of Systems   Constitutional:  Negative for chills and fever.   Gastrointestinal:  Negative for abdominal distention, abdominal pain, nausea and vomiting.   Genitourinary:  Negative for difficulty urinating, dysuria, flank pain, frequency, hematuria and urgency.   Musculoskeletal:  Positive for back pain and gait problem.   Psychiatric/Behavioral:  Negative for agitation and confusion.        PHYSICAL EXAM:  Temp 97.7 °F (36.5 °C) (Temporal)   Ht 1.626 m (5' 4\")   Wt 85.7 kg (189 lb)   BMI 32.44 kg/m²   Physical Exam  Vitals and nursing note reviewed.   Constitutional:       General: He is not in acute distress.     Appearance: Normal appearance. He is not ill-appearing.   Pulmonary:      Effort: Pulmonary effort is normal. No respiratory distress.   Abdominal:      General: There is no distension.      Tenderness: There is no abdominal tenderness. There is no right CVA tenderness or left CVA tenderness.   Neurological:      Mental Status: He is alert and oriented to person, place, and time. Mental status is at baseline.      Gait: Gait abnormal.

## 2024-07-30 ENCOUNTER — TELEPHONE (OUTPATIENT)
Dept: UROLOGY | Age: 59
End: 2024-07-30

## 2024-07-30 DIAGNOSIS — R97.20 ELEVATED PSA: Primary | ICD-10-CM

## 2024-07-30 NOTE — TELEPHONE ENCOUNTER
Pt requested the PSA orders sent to [facility]. Pt plans to go tomorrow for the labs.

## 2024-07-31 NOTE — TELEPHONE ENCOUNTER
Kaley with [facility] called and asked that the PSA order be faxed to her attention @[phone].     [facility] never received the order so patient will be going to the different location.

## 2024-08-06 ENCOUNTER — OFFICE VISIT (OUTPATIENT)
Dept: UROLOGY | Age: 59
End: 2024-08-06
Payer: MEDICAID

## 2024-08-06 VITALS — HEIGHT: 64 IN | TEMPERATURE: 97.2 F | WEIGHT: 193.6 LBS | BODY MASS INDEX: 33.05 KG/M2

## 2024-08-06 DIAGNOSIS — N13.8 HYPERTROPHY OF PROSTATE WITH URINARY OBSTRUCTION: ICD-10-CM

## 2024-08-06 DIAGNOSIS — R97.20 ELEVATED PSA: Primary | ICD-10-CM

## 2024-08-06 DIAGNOSIS — N20.0 RIGHT RENAL STONE: ICD-10-CM

## 2024-08-06 DIAGNOSIS — N40.1 HYPERTROPHY OF PROSTATE WITH URINARY OBSTRUCTION: ICD-10-CM

## 2024-08-06 DIAGNOSIS — N40.1 BPH WITH OBSTRUCTION/LOWER URINARY TRACT SYMPTOMS: ICD-10-CM

## 2024-08-06 DIAGNOSIS — N13.8 BPH WITH OBSTRUCTION/LOWER URINARY TRACT SYMPTOMS: ICD-10-CM

## 2024-08-06 LAB
APPEARANCE FLUID: CLEAR
BILIRUBIN, POC: NORMAL
BLOOD URINE, POC: NORMAL
CLARITY, POC: CLEAR
COLOR, POC: YELLOW
GLUCOSE URINE, POC: NORMAL
KETONES, POC: NORMAL
LEUKOCYTE EST, POC: NORMAL
PH, POC: 6.5
POST VOID RESIDUAL (PVR): NORMAL ML
PROTEIN, POC: NORMAL
SPECIFIC GRAVITY, POC: 1.02
UROBILINOGEN, POC: 1

## 2024-08-06 PROCEDURE — 51798 US URINE CAPACITY MEASURE: CPT | Performed by: NURSE PRACTITIONER

## 2024-08-06 PROCEDURE — 81002 URINALYSIS NONAUTO W/O SCOPE: CPT | Performed by: NURSE PRACTITIONER

## 2024-08-06 PROCEDURE — 99214 OFFICE O/P EST MOD 30 MIN: CPT | Performed by: NURSE PRACTITIONER

## 2024-08-06 RX ORDER — TAMSULOSIN HYDROCHLORIDE 0.4 MG/1
0.4 CAPSULE ORAL DAILY
Qty: 90 CAPSULE | Refills: 3 | Status: SHIPPED | OUTPATIENT
Start: 2024-08-06 | End: 2025-08-06

## 2024-08-06 RX ORDER — TAMSULOSIN HYDROCHLORIDE 0.4 MG/1
0.4 CAPSULE ORAL DAILY
Qty: 30 CAPSULE | Refills: 11 | OUTPATIENT
Start: 2024-08-06

## 2024-08-06 ASSESSMENT — ENCOUNTER SYMPTOMS
BACK PAIN: 0
NAUSEA: 0
ABDOMINAL DISTENTION: 0
VOMITING: 0
ABDOMINAL PAIN: 0

## 2024-08-06 NOTE — PROGRESS NOTES
Prem Canseco is a 58 y.o., male, Established patient who presents today   Chief Complaint   Patient presents with    Follow-up     I am here today for my follow up.       Patient presents for follow-up of nephrolithiasis as well as elevated PSA.  He does have an isolated incidence of elevated PSA in October 2021.  Since that time, his PSA has returned to normal.  Patient does report some worsening urgency.  He also had some mild obstructive symptoms which have been mostly alleviated with Flomax.    PSA 7/31/24 2.3  PSA 7/3/23  2.8  PSA 5/30/22 2.0  PSA 11/19/21 3.4  PSA 10/5/21 5.1    Patient also with history of nephrolithiasis.  He most recently underwent retrograde stone manipulation and stent placement for a 12 mm left UPJ stone.  He subsequently had ESWL on 11/28/2023.  Follow-up KUB revealed some residual stone fragments, so he underwent endoscopic management of the stones on 1/2/2024.  Stone analysis revealed calcium phosphate stones.  Most recent KUB imaging did not reveal any visible fragments in the left kidney.  There was 1 stone in the right lower pole that is previously known and stable.    REVIEW OF SYSTEMS:  Review of Systems   Constitutional:  Negative for chills and fever.   Gastrointestinal:  Negative for abdominal distention, abdominal pain, nausea and vomiting.   Genitourinary:  Negative for difficulty urinating, dysuria, flank pain, frequency, hematuria and urgency.   Musculoskeletal:  Negative for back pain and gait problem.   Psychiatric/Behavioral:  Negative for agitation and confusion.        PHYSICAL EXAM:  Temp 97.2 °F (36.2 °C) (Temporal)   Ht 1.626 m (5' 4\")   Wt 87.8 kg (193 lb 9.6 oz)   BMI 33.23 kg/m²   Physical Exam  Vitals and nursing note reviewed.   Constitutional:       General: He is not in acute distress.     Appearance: Normal appearance. He is not ill-appearing.   Pulmonary:      Effort: Pulmonary effort is normal. No respiratory distress.   Abdominal:      General: There

## 2025-07-22 DIAGNOSIS — N40.1 BPH WITH OBSTRUCTION/LOWER URINARY TRACT SYMPTOMS: ICD-10-CM

## 2025-07-22 DIAGNOSIS — N13.8 BPH WITH OBSTRUCTION/LOWER URINARY TRACT SYMPTOMS: ICD-10-CM

## 2025-07-22 RX ORDER — TAMSULOSIN HYDROCHLORIDE 0.4 MG/1
0.4 CAPSULE ORAL DAILY
Qty: 30 CAPSULE | Refills: 0 | Status: SHIPPED | OUTPATIENT
Start: 2025-07-22

## 2025-07-25 ENCOUNTER — TELEPHONE (OUTPATIENT)
Dept: UROLOGY | Age: 60
End: 2025-07-25

## 2025-07-25 NOTE — TELEPHONE ENCOUNTER
Prem has an appt with Barbara Santizo on 8-7-2025.   He is requesting that you fax his PSA order to him at [phone] so he can get it done in [place] prior to the appt.    Thank you

## 2025-08-07 ENCOUNTER — OFFICE VISIT (OUTPATIENT)
Dept: UROLOGY | Age: 60
End: 2025-08-07

## 2025-08-07 VITALS — HEIGHT: 64 IN | WEIGHT: 192 LBS | BODY MASS INDEX: 32.78 KG/M2 | TEMPERATURE: 97.2 F

## 2025-08-07 DIAGNOSIS — R97.20 ELEVATED PSA: ICD-10-CM

## 2025-08-07 DIAGNOSIS — N20.0 RIGHT RENAL STONE: ICD-10-CM

## 2025-08-07 DIAGNOSIS — N13.8 BPH WITH OBSTRUCTION/LOWER URINARY TRACT SYMPTOMS: Primary | ICD-10-CM

## 2025-08-07 DIAGNOSIS — N40.1 BPH WITH OBSTRUCTION/LOWER URINARY TRACT SYMPTOMS: Primary | ICD-10-CM

## 2025-08-07 LAB
BACTERIA URINE, POC: NORMAL
BILIRUBIN URINE: 0 MG/DL
BLOOD, URINE: POSITIVE
CASTS URINE, POC: NORMAL
CLARITY, UA: CLEAR
COLOR, UA: YELLOW
CRYSTALS URINE, POC: NORMAL
EPI CELLS URINE, POC: NORMAL
GLUCOSE URINE: NORMAL
KETONES, URINE: NEGATIVE
LEUKOCYTE EST, POC: NORMAL
NITRITE, URINE: NEGATIVE
PH UA: 7 (ref 4.5–8)
PROTEIN UA: NEGATIVE
RBC URINE, POC: NORMAL
SPECIFIC GRAVITY UA: 1.02 (ref 1–1.03)
UROBILINOGEN, URINE: NORMAL
WBC URINE, POC: NORMAL
YEAST URINE, POC: NORMAL

## 2025-08-07 RX ORDER — TAMSULOSIN HYDROCHLORIDE 0.4 MG/1
0.4 CAPSULE ORAL DAILY
Qty: 90 CAPSULE | Refills: 3 | Status: SHIPPED | OUTPATIENT
Start: 2025-08-07

## 2025-08-07 RX ORDER — HYDROCODONE BITARTRATE AND ACETAMINOPHEN 5; 325 MG/1; MG/1
1 TABLET ORAL EVERY 6 HOURS PRN
COMMUNITY
Start: 2025-07-07 | End: 2025-08-07 | Stop reason: ALTCHOICE

## 2025-08-07 ASSESSMENT — ENCOUNTER SYMPTOMS
VOMITING: 0
ABDOMINAL DISTENTION: 0
ABDOMINAL PAIN: 0
NAUSEA: 0

## (undated) DEVICE — LARYNGOSCOPE BLDE MAC HNDL M SZ 35 ST CURAPLEX CURAVIEW LED

## (undated) DEVICE — GUIDEWIRE ENDOSCP L150CM DIA0.035IN TIP 3CM PTFE NIT

## (undated) DEVICE — PAD,EYE,1-5/8X2 5/8,STERILE,LF,1/PK: Brand: MEDLINE

## (undated) DEVICE — FIBER LSR 200UM 2J 80HZ 60W D F L FOR LITHO MOSES

## (undated) DEVICE — SINGLE-USE DIGITAL FLEXIBLE URETEROSCOPE: Brand: LITHOVUE

## (undated) DEVICE — GUIDEWIRE VASC L145CM DIA0.035IN TIP L4CM PTFE S STL SHT

## (undated) DEVICE — SEAL ENDO INSTR SELF SEAL UROLOGY

## (undated) DEVICE — TUBE ET 7.5MM NSL ORAL BASIC CUF INTMED MURPHY EYE RADPQ

## (undated) DEVICE — STYLET INTUB 5FR L25CM ORAL DESIGNED RET DESIRED CRV SMOOTH

## (undated) DEVICE — GLOVE SURG SZ 75 CRM LTX FREE POLYISOPRENE POLYMER BEAD ANTI

## (undated) DEVICE — SOLUTION IRRIG 3000ML 0.9% SOD CHL USP UROMATIC PLAS CONT

## (undated) DEVICE — TUBE ET 7MM NSL ORAL BASIC CUF INTMED MURPHY EYE RADPQ MRK

## (undated) DEVICE — Z INACTIVE USE 2660664 SOLUTION IRRIG 3000ML 0.9% SOD CHL USP UROMATIC PLAS CONT

## (undated) DEVICE — BAG DRNGE COMB PK

## (undated) DEVICE — STERILE LATEX POWDER FREE SURGICAL GLOVES WITH HYDROGEL COATING: Brand: PROTEXIS

## (undated) DEVICE — SURGICAL PROCEDURE PACK CYTOSCOPY

## (undated) DEVICE — FLEXOR, URETERAL ACCESS SHEATH WITH AQ, HYDROPHILIC COATING: Brand: FLEXOR

## (undated) DEVICE — CATHETER URET 5FR L70CM OPN END SGL LUMN INJ HUB FLEXIMA

## (undated) DEVICE — Z DISCONTINUED NO SUB IDED FIBER LSR DIA365UM FLEXSHIELD COAT HOLM HI PWR POLISHED

## (undated) DEVICE — NITINOL STONE RETRIEVAL DEVICE: Brand: DAKOTA

## (undated) DEVICE — CURAVIEW LED LARYNSCP BLDE

## (undated) DEVICE — CONTAINER,SPECIMEN,OR STERILE,4OZ: Brand: MEDLINE